# Patient Record
Sex: FEMALE | Race: BLACK OR AFRICAN AMERICAN | NOT HISPANIC OR LATINO | URBAN - METROPOLITAN AREA
[De-identification: names, ages, dates, MRNs, and addresses within clinical notes are randomized per-mention and may not be internally consistent; named-entity substitution may affect disease eponyms.]

---

## 2017-04-25 ENCOUNTER — OUTPATIENT (OUTPATIENT)
Dept: OUTPATIENT SERVICES | Facility: HOSPITAL | Age: 37
LOS: 1 days | End: 2017-04-25
Payer: MEDICAID

## 2017-04-25 DIAGNOSIS — Z3A.00 WEEKS OF GESTATION OF PREGNANCY NOT SPECIFIED: ICD-10-CM

## 2017-04-25 DIAGNOSIS — O26.899 OTHER SPECIFIED PREGNANCY RELATED CONDITIONS, UNSPECIFIED TRIMESTER: ICD-10-CM

## 2017-04-25 LAB
APPEARANCE UR: CLEAR — SIGNIFICANT CHANGE UP
BILIRUB UR-MCNC: NEGATIVE — SIGNIFICANT CHANGE UP
BLOOD UR QL VISUAL: NEGATIVE — SIGNIFICANT CHANGE UP
COLOR SPEC: SIGNIFICANT CHANGE UP
GLUCOSE UR-MCNC: NEGATIVE — SIGNIFICANT CHANGE UP
KETONES UR-MCNC: NEGATIVE — SIGNIFICANT CHANGE UP
LEUKOCYTE ESTERASE UR-ACNC: NEGATIVE — SIGNIFICANT CHANGE UP
MUCOUS THREADS # UR AUTO: SIGNIFICANT CHANGE UP
NITRITE UR-MCNC: NEGATIVE — SIGNIFICANT CHANGE UP
PH UR: 7 — SIGNIFICANT CHANGE UP (ref 4.6–8)
PROT UR-MCNC: NEGATIVE — SIGNIFICANT CHANGE UP
RBC CASTS # UR COMP ASSIST: SIGNIFICANT CHANGE UP (ref 0–?)
SP GR SPEC: 1.01 — SIGNIFICANT CHANGE UP (ref 1–1.03)
SQUAMOUS # UR AUTO: SIGNIFICANT CHANGE UP
UROBILINOGEN FLD QL: NORMAL E.U. — SIGNIFICANT CHANGE UP (ref 0.1–0.2)
WBC UR QL: SIGNIFICANT CHANGE UP (ref 0–?)

## 2017-04-25 PROCEDURE — 76817 TRANSVAGINAL US OBSTETRIC: CPT | Mod: 26

## 2017-04-25 PROCEDURE — 76815 OB US LIMITED FETUS(S): CPT | Mod: 26

## 2017-04-25 PROCEDURE — 99203 OFFICE O/P NEW LOW 30 MIN: CPT | Mod: 25

## 2017-05-28 ENCOUNTER — OUTPATIENT (OUTPATIENT)
Dept: OUTPATIENT SERVICES | Facility: HOSPITAL | Age: 37
LOS: 1 days | End: 2017-05-28
Payer: MEDICAID

## 2017-05-28 DIAGNOSIS — O26.899 OTHER SPECIFIED PREGNANCY RELATED CONDITIONS, UNSPECIFIED TRIMESTER: ICD-10-CM

## 2017-05-28 LAB
APPEARANCE UR: CLEAR — SIGNIFICANT CHANGE UP
BILIRUB UR-MCNC: NEGATIVE — SIGNIFICANT CHANGE UP
BLOOD UR QL VISUAL: NEGATIVE — SIGNIFICANT CHANGE UP
COLOR SPEC: SIGNIFICANT CHANGE UP
GLUCOSE UR-MCNC: NEGATIVE — SIGNIFICANT CHANGE UP
KETONES UR-MCNC: SIGNIFICANT CHANGE UP
LEUKOCYTE ESTERASE UR-ACNC: NEGATIVE — SIGNIFICANT CHANGE UP
MUCOUS THREADS # UR AUTO: SIGNIFICANT CHANGE UP
NITRITE UR-MCNC: NEGATIVE — SIGNIFICANT CHANGE UP
PH UR: 6.5 — SIGNIFICANT CHANGE UP (ref 4.6–8)
PROT UR-MCNC: NEGATIVE — SIGNIFICANT CHANGE UP
RBC CASTS # UR COMP ASSIST: SIGNIFICANT CHANGE UP (ref 0–?)
SP GR SPEC: 1.01 — SIGNIFICANT CHANGE UP (ref 1–1.03)
SQUAMOUS # UR AUTO: SIGNIFICANT CHANGE UP
UROBILINOGEN FLD QL: NORMAL E.U. — SIGNIFICANT CHANGE UP (ref 0.1–0.2)
WBC UR QL: SIGNIFICANT CHANGE UP (ref 0–?)

## 2017-05-28 PROCEDURE — 99214 OFFICE O/P EST MOD 30 MIN: CPT | Mod: 25

## 2017-05-28 PROCEDURE — 76817 TRANSVAGINAL US OBSTETRIC: CPT | Mod: 26

## 2017-05-28 PROCEDURE — 76818 FETAL BIOPHYS PROFILE W/NST: CPT | Mod: 26

## 2017-05-28 RX ORDER — ACETAMINOPHEN 500 MG
975 TABLET ORAL ONCE
Qty: 0 | Refills: 0 | Status: COMPLETED | OUTPATIENT
Start: 2017-05-28 | End: 2017-05-28

## 2017-05-28 RX ORDER — NITROFURANTOIN MACROCRYSTAL 50 MG
1 CAPSULE ORAL
Qty: 14 | Refills: 0 | OUTPATIENT
Start: 2017-05-28 | End: 2017-06-04

## 2017-05-28 RX ADMIN — Medication 975 MILLIGRAM(S): at 06:42

## 2017-05-29 LAB
BACTERIA UR CULT: SIGNIFICANT CHANGE UP
SPECIMEN SOURCE: SIGNIFICANT CHANGE UP

## 2017-07-22 ENCOUNTER — OUTPATIENT (OUTPATIENT)
Dept: OUTPATIENT SERVICES | Facility: HOSPITAL | Age: 37
LOS: 1 days | End: 2017-07-22

## 2017-07-22 DIAGNOSIS — O26.899 OTHER SPECIFIED PREGNANCY RELATED CONDITIONS, UNSPECIFIED TRIMESTER: ICD-10-CM

## 2017-07-22 DIAGNOSIS — Z3A.00 WEEKS OF GESTATION OF PREGNANCY NOT SPECIFIED: ICD-10-CM

## 2017-07-22 RX ORDER — CITRIC ACID/SODIUM CITRATE 300-500 MG
30 SOLUTION, ORAL ORAL ONCE
Qty: 0 | Refills: 0 | Status: COMPLETED | OUTPATIENT
Start: 2017-07-22 | End: 2017-07-22

## 2017-07-22 RX ADMIN — Medication 30 MILLILITER(S): at 11:40

## 2017-07-26 ENCOUNTER — INPATIENT (INPATIENT)
Facility: HOSPITAL | Age: 37
LOS: 3 days | Discharge: HOME | End: 2017-07-30
Attending: OBSTETRICS & GYNECOLOGY | Admitting: OBSTETRICS & GYNECOLOGY

## 2017-07-26 VITALS — WEIGHT: 202.83 LBS | HEIGHT: 65 IN

## 2017-07-26 DIAGNOSIS — O26.899 OTHER SPECIFIED PREGNANCY RELATED CONDITIONS, UNSPECIFIED TRIMESTER: ICD-10-CM

## 2017-07-26 DIAGNOSIS — O41.00X0 OLIGOHYDRAMNIOS, UNSPECIFIED TRIMESTER, NOT APPLICABLE OR UNSPECIFIED: ICD-10-CM

## 2017-07-26 DIAGNOSIS — Z3A.00 WEEKS OF GESTATION OF PREGNANCY NOT SPECIFIED: ICD-10-CM

## 2017-07-26 LAB
BASOPHILS # BLD AUTO: 0.03 K/UL — SIGNIFICANT CHANGE UP (ref 0–0.2)
BASOPHILS NFR BLD AUTO: 0.4 % — SIGNIFICANT CHANGE UP (ref 0–2)
BLD GP AB SCN SERPL QL: NEGATIVE — SIGNIFICANT CHANGE UP
EOSINOPHIL # BLD AUTO: 0.09 K/UL — SIGNIFICANT CHANGE UP (ref 0–0.5)
EOSINOPHIL NFR BLD AUTO: 1.2 % — SIGNIFICANT CHANGE UP (ref 0–6)
HCT VFR BLD CALC: 38.2 % — SIGNIFICANT CHANGE UP (ref 34.5–45)
HGB BLD-MCNC: 13.1 G/DL — SIGNIFICANT CHANGE UP (ref 11.5–15.5)
IMM GRANULOCYTES # BLD AUTO: 0.04 # — SIGNIFICANT CHANGE UP
IMM GRANULOCYTES NFR BLD AUTO: 0.5 % — SIGNIFICANT CHANGE UP (ref 0–1.5)
LYMPHOCYTES # BLD AUTO: 1.6 K/UL — SIGNIFICANT CHANGE UP (ref 1–3.3)
LYMPHOCYTES # BLD AUTO: 21.1 % — SIGNIFICANT CHANGE UP (ref 13–44)
MCHC RBC-ENTMCNC: 28.7 PG — SIGNIFICANT CHANGE UP (ref 27–34)
MCHC RBC-ENTMCNC: 34.3 % — SIGNIFICANT CHANGE UP (ref 32–36)
MCV RBC AUTO: 83.8 FL — SIGNIFICANT CHANGE UP (ref 80–100)
MONOCYTES # BLD AUTO: 0.71 K/UL — SIGNIFICANT CHANGE UP (ref 0–0.9)
MONOCYTES NFR BLD AUTO: 9.3 % — SIGNIFICANT CHANGE UP (ref 2–14)
NEUTROPHILS # BLD AUTO: 5.13 K/UL — SIGNIFICANT CHANGE UP (ref 1.8–7.4)
NEUTROPHILS NFR BLD AUTO: 67.5 % — SIGNIFICANT CHANGE UP (ref 43–77)
NRBC # FLD: 0 — SIGNIFICANT CHANGE UP
PLATELET # BLD AUTO: 218 K/UL — SIGNIFICANT CHANGE UP (ref 150–400)
PMV BLD: 13.1 FL — HIGH (ref 7–13)
RBC # BLD: 4.56 M/UL — SIGNIFICANT CHANGE UP (ref 3.8–5.2)
RBC # FLD: 14.9 % — HIGH (ref 10.3–14.5)
RH IG SCN BLD-IMP: POSITIVE — SIGNIFICANT CHANGE UP
RH IG SCN BLD-IMP: POSITIVE — SIGNIFICANT CHANGE UP
WBC # BLD: 7.6 K/UL — SIGNIFICANT CHANGE UP (ref 3.8–10.5)
WBC # FLD AUTO: 7.6 K/UL — SIGNIFICANT CHANGE UP (ref 3.8–10.5)

## 2017-07-26 RX ORDER — OXYTOCIN 10 UNIT/ML
333.33 VIAL (ML) INJECTION
Qty: 20 | Refills: 0 | Status: DISCONTINUED | OUTPATIENT
Start: 2017-07-26 | End: 2017-07-27

## 2017-07-26 RX ORDER — SODIUM CHLORIDE 9 MG/ML
1000 INJECTION, SOLUTION INTRAVENOUS ONCE
Qty: 0 | Refills: 0 | Status: DISCONTINUED | OUTPATIENT
Start: 2017-07-26 | End: 2017-07-27

## 2017-07-26 RX ORDER — SODIUM CHLORIDE 9 MG/ML
1000 INJECTION, SOLUTION INTRAVENOUS
Qty: 0 | Refills: 0 | Status: DISCONTINUED | OUTPATIENT
Start: 2017-07-26 | End: 2017-07-27

## 2017-07-26 RX ADMIN — SODIUM CHLORIDE 125 MILLILITER(S): 9 INJECTION, SOLUTION INTRAVENOUS at 19:30

## 2017-07-27 LAB
HBV SURFACE AG SER-ACNC: NEGATIVE — SIGNIFICANT CHANGE UP
T PALLIDUM AB TITR SER: NEGATIVE — SIGNIFICANT CHANGE UP

## 2017-07-27 RX ORDER — METOCLOPRAMIDE HCL 10 MG
10 TABLET ORAL ONCE
Qty: 0 | Refills: 0 | Status: DISCONTINUED | OUTPATIENT
Start: 2017-07-27 | End: 2017-07-27

## 2017-07-27 RX ORDER — KETOROLAC TROMETHAMINE 30 MG/ML
30 SYRINGE (ML) INJECTION EVERY 6 HOURS
Qty: 0 | Refills: 0 | Status: DISCONTINUED | OUTPATIENT
Start: 2017-07-27 | End: 2017-07-28

## 2017-07-27 RX ORDER — FERROUS SULFATE 325(65) MG
325 TABLET ORAL DAILY
Qty: 0 | Refills: 0 | Status: DISCONTINUED | OUTPATIENT
Start: 2017-07-27 | End: 2017-07-30

## 2017-07-27 RX ORDER — IBUPROFEN 200 MG
600 TABLET ORAL EVERY 6 HOURS
Qty: 0 | Refills: 0 | Status: COMPLETED | OUTPATIENT
Start: 2017-07-27 | End: 2018-06-25

## 2017-07-27 RX ORDER — SODIUM CHLORIDE 9 MG/ML
300 INJECTION INTRAMUSCULAR; INTRAVENOUS; SUBCUTANEOUS ONCE
Qty: 0 | Refills: 0 | Status: DISCONTINUED | OUTPATIENT
Start: 2017-07-27 | End: 2017-07-27

## 2017-07-27 RX ORDER — OXYCODONE HYDROCHLORIDE 5 MG/1
5 TABLET ORAL
Qty: 0 | Refills: 0 | Status: DISCONTINUED | OUTPATIENT
Start: 2017-07-27 | End: 2017-07-27

## 2017-07-27 RX ORDER — BUTORPHANOL TARTRATE 2 MG/ML
2 INJECTION, SOLUTION INTRAMUSCULAR; INTRAVENOUS ONCE
Qty: 0 | Refills: 0 | Status: DISCONTINUED | OUTPATIENT
Start: 2017-07-27 | End: 2017-07-27

## 2017-07-27 RX ORDER — IBUPROFEN 200 MG
600 TABLET ORAL EVERY 6 HOURS
Qty: 0 | Refills: 0 | Status: DISCONTINUED | OUTPATIENT
Start: 2017-07-27 | End: 2017-07-27

## 2017-07-27 RX ORDER — OXYCODONE HYDROCHLORIDE 5 MG/1
5 TABLET ORAL
Qty: 0 | Refills: 0 | Status: COMPLETED | OUTPATIENT
Start: 2017-07-27 | End: 2017-08-03

## 2017-07-27 RX ORDER — TETANUS TOXOID, REDUCED DIPHTHERIA TOXOID AND ACELLULAR PERTUSSIS VACCINE, ADSORBED 5; 2.5; 8; 8; 2.5 [IU]/.5ML; [IU]/.5ML; UG/.5ML; UG/.5ML; UG/.5ML
0.5 SUSPENSION INTRAMUSCULAR ONCE
Qty: 0 | Refills: 0 | Status: DISCONTINUED | OUTPATIENT
Start: 2017-07-27 | End: 2017-07-30

## 2017-07-27 RX ORDER — CITRIC ACID/SODIUM CITRATE 300-500 MG
30 SOLUTION, ORAL ORAL ONCE
Qty: 0 | Refills: 0 | Status: COMPLETED | OUTPATIENT
Start: 2017-07-27 | End: 2017-07-27

## 2017-07-27 RX ORDER — DOCUSATE SODIUM 100 MG
100 CAPSULE ORAL
Qty: 0 | Refills: 0 | Status: DISCONTINUED | OUTPATIENT
Start: 2017-07-27 | End: 2017-07-30

## 2017-07-27 RX ORDER — SODIUM CHLORIDE 9 MG/ML
1000 INJECTION, SOLUTION INTRAVENOUS
Qty: 0 | Refills: 0 | Status: DISCONTINUED | OUTPATIENT
Start: 2017-07-27 | End: 2017-07-28

## 2017-07-27 RX ORDER — OXYTOCIN 10 UNIT/ML
333.33 VIAL (ML) INJECTION
Qty: 20 | Refills: 0 | Status: DISCONTINUED | OUTPATIENT
Start: 2017-07-27 | End: 2017-07-27

## 2017-07-27 RX ORDER — OXYCODONE HYDROCHLORIDE 5 MG/1
10 TABLET ORAL
Qty: 0 | Refills: 0 | Status: DISCONTINUED | OUTPATIENT
Start: 2017-07-27 | End: 2017-07-27

## 2017-07-27 RX ORDER — HYDROMORPHONE HYDROCHLORIDE 2 MG/ML
0.5 INJECTION INTRAMUSCULAR; INTRAVENOUS; SUBCUTANEOUS
Qty: 0 | Refills: 0 | Status: DISCONTINUED | OUTPATIENT
Start: 2017-07-27 | End: 2017-07-27

## 2017-07-27 RX ORDER — METOCLOPRAMIDE HCL 10 MG
10 TABLET ORAL ONCE
Qty: 0 | Refills: 0 | Status: COMPLETED | OUTPATIENT
Start: 2017-07-27 | End: 2017-07-27

## 2017-07-27 RX ORDER — OXYCODONE HYDROCHLORIDE 5 MG/1
5 TABLET ORAL EVERY 4 HOURS
Qty: 0 | Refills: 0 | Status: DISCONTINUED | OUTPATIENT
Start: 2017-07-27 | End: 2017-07-30

## 2017-07-27 RX ORDER — KETOROLAC TROMETHAMINE 30 MG/ML
30 SYRINGE (ML) INJECTION EVERY 6 HOURS
Qty: 0 | Refills: 0 | Status: DISCONTINUED | OUTPATIENT
Start: 2017-07-27 | End: 2017-07-27

## 2017-07-27 RX ORDER — SODIUM CHLORIDE 9 MG/ML
1000 INJECTION, SOLUTION INTRAVENOUS
Qty: 0 | Refills: 0 | Status: DISCONTINUED | OUTPATIENT
Start: 2017-07-27 | End: 2017-07-27

## 2017-07-27 RX ORDER — MORPHINE SULFATE 50 MG/1
3 CAPSULE, EXTENDED RELEASE ORAL ONCE
Qty: 0 | Refills: 0 | Status: DISCONTINUED | OUTPATIENT
Start: 2017-07-27 | End: 2017-07-28

## 2017-07-27 RX ORDER — OXYTOCIN 10 UNIT/ML
41.67 VIAL (ML) INJECTION
Qty: 20 | Refills: 0 | Status: DISCONTINUED | OUTPATIENT
Start: 2017-07-27 | End: 2017-07-27

## 2017-07-27 RX ORDER — FAMOTIDINE 10 MG/ML
20 INJECTION INTRAVENOUS ONCE
Qty: 0 | Refills: 0 | Status: COMPLETED | OUTPATIENT
Start: 2017-07-27 | End: 2017-07-27

## 2017-07-27 RX ORDER — SODIUM CHLORIDE 9 MG/ML
1000 INJECTION INTRAMUSCULAR; INTRAVENOUS; SUBCUTANEOUS
Qty: 0 | Refills: 0 | Status: DISCONTINUED | OUTPATIENT
Start: 2017-07-27 | End: 2017-07-27

## 2017-07-27 RX ORDER — SODIUM CHLORIDE 9 MG/ML
500 INJECTION, SOLUTION INTRAVENOUS ONCE
Qty: 0 | Refills: 0 | Status: COMPLETED | OUTPATIENT
Start: 2017-07-27 | End: 2017-07-27

## 2017-07-27 RX ORDER — FENTANYL CITRATE 50 UG/ML
25 INJECTION INTRAVENOUS
Qty: 0 | Refills: 0 | Status: DISCONTINUED | OUTPATIENT
Start: 2017-07-27 | End: 2017-07-27

## 2017-07-27 RX ORDER — OXYCODONE HYDROCHLORIDE 5 MG/1
5 TABLET ORAL EVERY 4 HOURS
Qty: 0 | Refills: 0 | Status: DISCONTINUED | OUTPATIENT
Start: 2017-07-27 | End: 2017-07-27

## 2017-07-27 RX ORDER — NALOXONE HYDROCHLORIDE 4 MG/.1ML
0.1 SPRAY NASAL
Qty: 0 | Refills: 0 | Status: DISCONTINUED | OUTPATIENT
Start: 2017-07-27 | End: 2017-07-28

## 2017-07-27 RX ORDER — SIMETHICONE 80 MG/1
80 TABLET, CHEWABLE ORAL EVERY 4 HOURS
Qty: 0 | Refills: 0 | Status: DISCONTINUED | OUTPATIENT
Start: 2017-07-27 | End: 2017-07-30

## 2017-07-27 RX ORDER — LANOLIN
1 OINTMENT (GRAM) TOPICAL
Qty: 0 | Refills: 0 | Status: DISCONTINUED | OUTPATIENT
Start: 2017-07-27 | End: 2017-07-30

## 2017-07-27 RX ORDER — ACETAMINOPHEN 500 MG
975 TABLET ORAL EVERY 6 HOURS
Qty: 0 | Refills: 0 | Status: DISCONTINUED | OUTPATIENT
Start: 2017-07-27 | End: 2017-07-30

## 2017-07-27 RX ORDER — ONDANSETRON 8 MG/1
4 TABLET, FILM COATED ORAL EVERY 6 HOURS
Qty: 0 | Refills: 0 | Status: DISCONTINUED | OUTPATIENT
Start: 2017-07-27 | End: 2017-07-28

## 2017-07-27 RX ORDER — DIPHENHYDRAMINE HCL 50 MG
25 CAPSULE ORAL EVERY 6 HOURS
Qty: 0 | Refills: 0 | Status: DISCONTINUED | OUTPATIENT
Start: 2017-07-27 | End: 2017-07-30

## 2017-07-27 RX ORDER — HEPARIN SODIUM 5000 [USP'U]/ML
5000 INJECTION INTRAVENOUS; SUBCUTANEOUS EVERY 12 HOURS
Qty: 0 | Refills: 0 | Status: DISCONTINUED | OUTPATIENT
Start: 2017-07-27 | End: 2017-07-30

## 2017-07-27 RX ORDER — GLYCERIN ADULT
1 SUPPOSITORY, RECTAL RECTAL AT BEDTIME
Qty: 0 | Refills: 0 | Status: DISCONTINUED | OUTPATIENT
Start: 2017-07-27 | End: 2017-07-30

## 2017-07-27 RX ADMIN — HYDROMORPHONE HYDROCHLORIDE 0.5 MILLIGRAM(S): 2 INJECTION INTRAMUSCULAR; INTRAVENOUS; SUBCUTANEOUS at 11:28

## 2017-07-27 RX ADMIN — HYDROMORPHONE HYDROCHLORIDE 0.5 MILLIGRAM(S): 2 INJECTION INTRAMUSCULAR; INTRAVENOUS; SUBCUTANEOUS at 11:08

## 2017-07-27 RX ADMIN — Medication 30 MILLILITER(S): at 07:33

## 2017-07-27 RX ADMIN — Medication 25 MILLIGRAM(S): at 14:41

## 2017-07-27 RX ADMIN — FAMOTIDINE 20 MILLIGRAM(S): 10 INJECTION INTRAVENOUS at 07:27

## 2017-07-27 RX ADMIN — SODIUM CHLORIDE 125 MILLILITER(S): 9 INJECTION, SOLUTION INTRAVENOUS at 18:54

## 2017-07-27 RX ADMIN — Medication 204 MILLIGRAM(S): at 04:40

## 2017-07-27 RX ADMIN — Medication 30 MILLIGRAM(S): at 18:54

## 2017-07-27 RX ADMIN — Medication 1 APPLICATION(S): at 23:36

## 2017-07-27 RX ADMIN — SODIUM CHLORIDE 1000 MILLILITER(S): 9 INJECTION, SOLUTION INTRAVENOUS at 09:52

## 2017-07-27 RX ADMIN — Medication 30 MILLIGRAM(S): at 18:21

## 2017-07-27 RX ADMIN — Medication 975 MILLIGRAM(S): at 11:34

## 2017-07-27 RX ADMIN — SODIUM CHLORIDE 125 MILLILITER(S): 9 INJECTION, SOLUTION INTRAVENOUS at 05:23

## 2017-07-27 RX ADMIN — Medication 30 MILLIGRAM(S): at 23:36

## 2017-07-27 RX ADMIN — HEPARIN SODIUM 5000 UNIT(S): 5000 INJECTION INTRAVENOUS; SUBCUTANEOUS at 14:41

## 2017-07-27 RX ADMIN — Medication 10 MILLIGRAM(S): at 07:27

## 2017-07-27 RX ADMIN — ONDANSETRON 4 MILLIGRAM(S): 8 TABLET, FILM COATED ORAL at 15:46

## 2017-07-27 RX ADMIN — BUTORPHANOL TARTRATE 2 MILLIGRAM(S): 2 INJECTION, SOLUTION INTRAMUSCULAR; INTRAVENOUS at 04:41

## 2017-07-27 RX ADMIN — BUTORPHANOL TARTRATE 2 MILLIGRAM(S): 2 INJECTION, SOLUTION INTRAMUSCULAR; INTRAVENOUS at 05:22

## 2017-07-28 LAB
HCT VFR BLD CALC: 36 % — SIGNIFICANT CHANGE UP (ref 34.5–45)
HGB BLD-MCNC: 11.8 G/DL — SIGNIFICANT CHANGE UP (ref 11.5–15.5)
MCHC RBC-ENTMCNC: 27.8 PG — SIGNIFICANT CHANGE UP (ref 27–34)
MCHC RBC-ENTMCNC: 32.8 % — SIGNIFICANT CHANGE UP (ref 32–36)
MCV RBC AUTO: 84.7 FL — SIGNIFICANT CHANGE UP (ref 80–100)
NRBC # FLD: 0 — SIGNIFICANT CHANGE UP
PLATELET # BLD AUTO: 172 K/UL — SIGNIFICANT CHANGE UP (ref 150–400)
PMV BLD: 12.9 FL — SIGNIFICANT CHANGE UP (ref 7–13)
RBC # BLD: 4.25 M/UL — SIGNIFICANT CHANGE UP (ref 3.8–5.2)
RBC # FLD: 15 % — HIGH (ref 10.3–14.5)
WBC # BLD: 12.94 K/UL — HIGH (ref 3.8–10.5)
WBC # FLD AUTO: 12.94 K/UL — HIGH (ref 3.8–10.5)

## 2017-07-28 RX ORDER — OXYCODONE HYDROCHLORIDE 5 MG/1
5 TABLET ORAL
Qty: 0 | Refills: 0 | Status: DISCONTINUED | OUTPATIENT
Start: 2017-07-28 | End: 2017-07-30

## 2017-07-28 RX ORDER — IBUPROFEN 200 MG
600 TABLET ORAL EVERY 6 HOURS
Qty: 0 | Refills: 0 | Status: DISCONTINUED | OUTPATIENT
Start: 2017-07-28 | End: 2017-07-30

## 2017-07-28 RX ADMIN — Medication 30 MILLIGRAM(S): at 00:05

## 2017-07-28 RX ADMIN — Medication 975 MILLIGRAM(S): at 17:17

## 2017-07-28 RX ADMIN — OXYCODONE HYDROCHLORIDE 5 MILLIGRAM(S): 5 TABLET ORAL at 23:32

## 2017-07-28 RX ADMIN — Medication 600 MILLIGRAM(S): at 23:02

## 2017-07-28 RX ADMIN — Medication 975 MILLIGRAM(S): at 23:02

## 2017-07-28 RX ADMIN — HEPARIN SODIUM 5000 UNIT(S): 5000 INJECTION INTRAVENOUS; SUBCUTANEOUS at 17:17

## 2017-07-28 RX ADMIN — Medication 100 MILLIGRAM(S): at 12:26

## 2017-07-28 RX ADMIN — HEPARIN SODIUM 5000 UNIT(S): 5000 INJECTION INTRAVENOUS; SUBCUTANEOUS at 05:43

## 2017-07-28 RX ADMIN — Medication 30 MILLIGRAM(S): at 06:15

## 2017-07-28 RX ADMIN — Medication 600 MILLIGRAM(S): at 17:17

## 2017-07-28 RX ADMIN — Medication 600 MILLIGRAM(S): at 12:27

## 2017-07-28 RX ADMIN — Medication 975 MILLIGRAM(S): at 12:26

## 2017-07-28 RX ADMIN — Medication 1 TABLET(S): at 12:26

## 2017-07-28 RX ADMIN — Medication 600 MILLIGRAM(S): at 12:26

## 2017-07-28 RX ADMIN — SIMETHICONE 80 MILLIGRAM(S): 80 TABLET, CHEWABLE ORAL at 12:26

## 2017-07-28 RX ADMIN — Medication 30 MILLIGRAM(S): at 05:43

## 2017-07-28 RX ADMIN — Medication 975 MILLIGRAM(S): at 05:43

## 2017-07-28 RX ADMIN — OXYCODONE HYDROCHLORIDE 5 MILLIGRAM(S): 5 TABLET ORAL at 23:02

## 2017-07-28 RX ADMIN — Medication 600 MILLIGRAM(S): at 23:32

## 2017-07-28 NOTE — DISCHARGE NOTE OB - MEDICATION SUMMARY - MEDICATIONS TO STOP TAKING
I will STOP taking the medications listed below when I get home from the hospital:  None I will STOP taking the medications listed below when I get home from the hospital:    Macrobid macrocrystals-monohydrate 100 mg oral capsule  -- 1 cap(s) by mouth 2 times a day  -- Finish all this medication unless otherwise directed by prescriber.  May discolor urine or feces.  Take with food or milk.

## 2017-07-28 NOTE — DISCHARGE NOTE OB - HOSPITAL COURSE
Admitted with for category 2 tracing.  Labor induced.   section performed for fetal inability to tolerate labor.  Delivered liveborn male infant. Mother and baby stable.

## 2017-07-28 NOTE — PROGRESS NOTE ADULT - PROBLEM SELECTOR PLAN 1
- Continue regular diet.  - Increase ambulation.  - Continue motrin, tylenol, oxycodone PRN for pain control.  .  - Discontinue harris catheter at 24 hours post-op   - F/u AM CBC    Cristine Lynn PGY-1

## 2017-07-28 NOTE — DISCHARGE NOTE OB - MEDICATION SUMMARY - MEDICATIONS TO TAKE
I will START or STAY ON the medications listed below when I get home from the hospital:    ibuprofen 600 mg oral tablet  -- 1 tab(s) by mouth every 6 hours  -- Indication: For pain    acetaminophen 325 mg oral tablet  -- 3 tab(s) by mouth every 6 hours  -- Indication: For pain    Prenatal Multivitamins with Folic Acid 1 mg oral tablet  -- 1 tab(s) by mouth once a day  -- Indication: For breastfeeding I will START or STAY ON the medications listed below when I get home from the hospital:    acetaminophen 325 mg oral tablet  -- 3 tab(s) by mouth every 6 hours  -- Indication: For pain    ibuprofen 600 mg oral tablet  -- 1 tab(s) by mouth every 6 hours, As Needed -for mild pain MDD:4  -- Indication: For pain    oxycodone-acetaminophen 5mg-325mg oral tablet  -- 1 tab(s) by mouth every 6 hours, As Needed -for moderate pain MDD:4  -- Caution federal law prohibits the transfer of this drug to any person other  than the person for whom it was prescribed.  May cause drowsiness.  Alcohol may intensify this effect.  Use care when operating dangerous machinery.  This prescription cannot be refilled.  This product contains acetaminophen.  Do not use  with any other product containing acetaminophen to prevent possible liver damage.  Using more of this medication than prescribed may cause serious breathing problems.    -- Indication: For pain    Prenatal Multivitamins with Folic Acid 1 mg oral tablet  -- 1 tab(s) by mouth once a day  -- Indication: For breastfeeding I will START or STAY ON the medications listed below when I get home from the hospital:    acetaminophen 325 mg oral tablet  -- 3 tab(s) by mouth every 6 hours  -- Indication: For pain    ibuprofen 600 mg oral tablet  -- 1 tab(s) by mouth every 6 hours, As Needed -for mild pain MDD:4  -- Indication: For pain    Prenatal Multivitamins with Folic Acid 1 mg oral tablet  -- 1 tab(s) by mouth once a day  -- Indication: For breastfeeding

## 2017-07-28 NOTE — DISCHARGE NOTE OB - PATIENT PORTAL LINK FT
“You can access the [employer] Patient Portal, offered by [facility], by registering with the following website: [email or web address]”

## 2017-07-28 NOTE — PROGRESS NOTE ADULT - SUBJECTIVE AND OBJECTIVE BOX
Postop Day  __1_ s/p   C- Section    THERAPY:  [  ] Spinal morphine  [ x ] Epidural morphine   [  ] IV PCA Hydromorphone 1 mg/ml      Sedation Score:	  [ x ] Alert	    [  ] Drowsy        [  ] Arousable	[  ] Asleep	[  ] Unresponsive    Side Effects:	  [ x ] None	     [  ] Nausea        [  ] Pruritus        [  ] Weakness   [  ] Numbness        ASSESSMENT/ PLAN   [   ] Discontinue         [  ] Continue  [ x ]Documentation and Verification of current medications     Comments: Transitioned to prn oral analgesics by primary team. No anesthetic complication.

## 2017-07-28 NOTE — PROGRESS NOTE ADULT - SUBJECTIVE AND OBJECTIVE BOX
Day ___1 of Anesthesia Pain Management Service    SUBJECTIVE:  Pain Scale Score	At rest: __none_ 	With Activity: __mild_ 	[x ] Refer to charted pain scores    THERAPY:    s/p ____3__ mg PF morphine on ____7/27/17_    OBJECTIVE:    Sedation Score:	[ x] Alert	[ ] Drowsy	[ ] Arousable	[ ] Asleep	[ ] Unresponsive    Side Effects:	[x ] None	[ ] Nausea	[ ] Vomiting	[ ] Pruritus  		  [ ] Weakness		[ ] Numbness	[ ] Other:    Vital Signs Last 24 Hrs  T(C): 37.3 (28 Jul 2017 05:49), Max: 37.4 (27 Jul 2017 21:57)  T(F): 99.1 (28 Jul 2017 05:49), Max: 99.3 (27 Jul 2017 21:57)  HR: 98 (28 Jul 2017 05:49) (76 - 113)  BP: 101/55 (28 Jul 2017 05:49) (101/55 - 125/76)  BP(mean): 70 (27 Jul 2017 11:30) (70 - 84)  RR: 18 (28 Jul 2017 05:49) (16 - 22)  SpO2: 98% (28 Jul 2017 05:49) (95% - 100%)    ASSESSMENT/ PLAN  [x ] Patient transitioned to prn analgesics  [ x] Pain management per primary service, pain service to sign off   [ x]Documentation and Verification of current medications     Comments: No anesthetic complications.

## 2017-07-28 NOTE — DISCHARGE NOTE OB - CARE PLAN
Goal:	normal postoperative course  Instructions for follow-up, activity and diet:	nothing in vagina, no heavy lifting Principal Discharge DX:	 delivery delivered  Goal:	normal postoperative course  Instructions for follow-up, activity and diet:	nothing in vagina, no heavy lifting

## 2017-07-28 NOTE — PROGRESS NOTE ADULT - SUBJECTIVE AND OBJECTIVE BOX
OB Progress Note:  Delivery, POD#1    S: 36yo POD#1 s/p LTCS . Her pain is well controlled. She is tolerating a regular diet and passing flatus. Denies N/V. Denies CP/SOB/lightheadedness/dizziness.   She has been minimally ambulating in room without difficulty.  Indwelling catheter is in place - to be removed this AM.      O:   Vital Signs Last 24 Hrs  T(C): 37.3 (2017 05:49), Max: 37.4 (2017 21:57)  T(F): 99.1 (2017 05:49), Max: 99.3 (2017 21:57)  HR: 98 (2017 05:49) (76 - 113)  BP: 101/55 (2017 05:49) (101/55 - 125/76)  BP(mean): 70 (2017 11:30) (70 - 84)  RR: 18 (2017 05:49) (16 - 22)  SpO2: 98% (2017 05:49) (95% - 100%)    Labs:  Blood type: O Positive  Rubella IgG: RPR: Negative                          13.1   7.60 >-----------< 218    ( - @ 17:30 )             38.2                  PE:  General: NAD  Abdomen: Mildly distended, appropriately tender, incision c/d/i.  Extremities: No erythema, no pitting edema

## 2017-07-28 NOTE — DISCHARGE NOTE OB - CARE PROVIDER_API CALL
Amber Aguirre (MD), Obstetrics and Gynecology  [address] [place] [address]  Phone: [phone]  Fax: [phone]

## 2017-07-29 LAB
RUBV IGG SER-ACNC: 7 INDEX — SIGNIFICANT CHANGE UP
RUBV IGG SER-IMP: POSITIVE — SIGNIFICANT CHANGE UP

## 2017-07-29 RX ORDER — IBUPROFEN 200 MG
1 TABLET ORAL
Qty: 0 | Refills: 0 | COMMUNITY
Start: 2017-07-29

## 2017-07-29 RX ORDER — ACETAMINOPHEN 500 MG
3 TABLET ORAL
Qty: 0 | Refills: 0 | COMMUNITY
Start: 2017-07-29

## 2017-07-29 RX ORDER — MAGNESIUM HYDROXIDE 400 MG/1
30 TABLET, CHEWABLE ORAL DAILY
Qty: 0 | Refills: 0 | Status: DISCONTINUED | OUTPATIENT
Start: 2017-07-29 | End: 2017-07-30

## 2017-07-29 RX ADMIN — Medication 975 MILLIGRAM(S): at 18:05

## 2017-07-29 RX ADMIN — MAGNESIUM HYDROXIDE 30 MILLILITER(S): 400 TABLET, CHEWABLE ORAL at 10:26

## 2017-07-29 RX ADMIN — Medication 600 MILLIGRAM(S): at 19:05

## 2017-07-29 RX ADMIN — Medication 600 MILLIGRAM(S): at 05:30

## 2017-07-29 RX ADMIN — Medication 600 MILLIGRAM(S): at 13:07

## 2017-07-29 RX ADMIN — OXYCODONE HYDROCHLORIDE 5 MILLIGRAM(S): 5 TABLET ORAL at 18:54

## 2017-07-29 RX ADMIN — HEPARIN SODIUM 5000 UNIT(S): 5000 INJECTION INTRAVENOUS; SUBCUTANEOUS at 05:29

## 2017-07-29 RX ADMIN — OXYCODONE HYDROCHLORIDE 5 MILLIGRAM(S): 5 TABLET ORAL at 15:20

## 2017-07-29 RX ADMIN — Medication 600 MILLIGRAM(S): at 06:00

## 2017-07-29 RX ADMIN — Medication 975 MILLIGRAM(S): at 12:07

## 2017-07-29 RX ADMIN — OXYCODONE HYDROCHLORIDE 5 MILLIGRAM(S): 5 TABLET ORAL at 19:54

## 2017-07-29 RX ADMIN — HEPARIN SODIUM 5000 UNIT(S): 5000 INJECTION INTRAVENOUS; SUBCUTANEOUS at 18:05

## 2017-07-29 RX ADMIN — Medication 975 MILLIGRAM(S): at 05:30

## 2017-07-29 RX ADMIN — Medication 600 MILLIGRAM(S): at 18:05

## 2017-07-29 RX ADMIN — OXYCODONE HYDROCHLORIDE 5 MILLIGRAM(S): 5 TABLET ORAL at 14:23

## 2017-07-29 RX ADMIN — Medication 600 MILLIGRAM(S): at 12:07

## 2017-07-29 RX ADMIN — Medication 325 MILLIGRAM(S): at 12:07

## 2017-07-29 RX ADMIN — Medication 1 TABLET(S): at 12:07

## 2017-07-29 NOTE — LACTATION INITIAL EVALUATION - LACTATION INTERVENTIONS
initiate hand expression routine/initiate skin to skin/assisted with deep latch and positioning  discussed  signs  of  effective  feeding and  swallowing., instructed in  hand  expression  and  pumping  between  feeding  to  stimulate  milk  supply  if  nbn  not  effective ,  discussed  belly  size  and  needs  of  nbn  ,  instructed  to  offer  breast  first   decrease  formula  as  milk  comes  in.

## 2017-07-29 NOTE — PROGRESS NOTE ADULT - SUBJECTIVE AND OBJECTIVE BOX
SUBJECTIVE:    Pain: Controlled    Complaints: None    MILESTONES:    Alert and Oriented x 3  [ x ]  Out of bed/ ambulating. [ x ]  Flatus:   Positive [ x ]  Negative [  ]  Bowel movement  [  ] Positive [  ] Negative   Voiding [x  ] Due to void [  ]   Mendez/Indwelling catheter in place [  ]  Diet: Regular [ x ]  Clears [  ]  NPO [  ]    Infant feeding:  Breast [  ]   Bottle [  ]  Both [ x ]  Feeding related issues and/or concerns:      OBJECTIVE:  T(C): 36.7 (17 @ 06:00), Max: 36.9 (17 @ 14:00)  HR: 99 (17 @ 06:00) (95 - 99)  BP: 100/60 (17 @ 06:00) (95/55 - 111/68)  RR: 18 (17 @ 06:00) (17 - 18)  SpO2: 100% (17 @ 06:00) (100% - 100%)  Wt(kg): --                        11.8   12.94 )-----------( 172      ( 2017 05:55 )             36.0           Blood Type: O Positive    RPR: Negative          MEDICATIONS  (STANDING):  acetaminophen   Tablet 975 milliGRAM(s) Oral every 6 hours  diphtheria/tetanus/pertussis (acellular) Vaccine (ADAcel) 0.5 milliLiter(s) IntraMuscular once  ferrous    sulfate 325 milliGRAM(s) Oral daily  prenatal multivitamin 1 Tablet(s) Oral daily  heparin  Injectable 5000 Unit(s) SubCutaneous every 12 hours  ibuprofen  Tablet 600 milliGRAM(s) Oral every 6 hours  oxyCODONE    IR 5 milliGRAM(s) Oral every 3 hours    MEDICATIONS  (PRN):  simethicone 80 milliGRAM(s) Chew every 4 hours PRN Gas  diphenhydrAMINE   Capsule 25 milliGRAM(s) Oral every 6 hours PRN Itching  glycerin Suppository - Adult 1 Suppository(s) Rectal at bedtime PRN Constipation  docusate sodium 100 milliGRAM(s) Oral two times a day PRN Stool Softening  lanolin Ointment 1 Application(s) Topical every 3 hours PRN Sore Nipples  oxyCODONE    IR 5 milliGRAM(s) Oral every 4 hours PRN Severe Pain (7 - 10)  magnesium hydroxide Suspension 30 milliLiter(s) Oral daily PRN Constipation        ASSESSMENT:    37y     G 2     P  1011       PO Day#  2        Delivery: Primary [ x ]    Repeat [  ]                                         Indication of procedure: Abnormal Fetal Status    Condition: Stable    Past Medical History significant for: HPI: Hx. HSV, Asthma      Current Issues:    Breasts:  Soft [x  ]   Engorged [  ]  Nipples:  Abdomen: Soft [ x ]   Distended [  ] Nontender [  ]   Bowel sounds :  Present [  ]  Absent [  ]   Fundus:  Firm [x  ]  Boggy [  ]  Abdominal incision: Clean, Dry and Intact [x  ]  Staples [  ] Steri Strips [ x ] Dermabond [  ] Sutures [  ]    Patient wearing abdominal binder for support.    Vaginal: Lochia:  Heavy [  ]  Moderate [ x ]   Scant [  ]  Extremities: Edema [  ] Negative Brittany's Sign [  ] Nontender Anurag  [ x ] Positive pedal pulses [  ]    Other relevant physical exam findings:      PLAN:    Plan: Increase ambulation, analgesia PRN and pain medication protocol standing oxycodone, ibuprofen and acetaminophen.    Diet: Regular diet    Continue routine post-operative and postpartum care.     Discharge Planning [ x ]    For discharge Today  [    ]    Consults:  Social Work [  ]  Lactation [ x ]  Other [         ]

## 2017-07-29 NOTE — LACTATION INITIAL EVALUATION - INTERVENTION OUTCOME
verbalizes understanding/demonstrates understanding of teaching/nbn demonstrated  deep latch and  performed  with sucking and swallowing  noted ,   pt instructed to notify pediatrician if nbn not feeding 8-12 times/24 hours and not voiding and stooling according to breastfeeding log.   lactation  list  and  phone  number  for   [facility]  breastfeeding  support  given,  rn  aware  of  plan  . rn  made aware of plan and to document latch and  positioning of  further  feeding and  assist  as  necessary.

## 2017-07-30 VITALS
RESPIRATION RATE: 17 BRPM | OXYGEN SATURATION: 97 % | DIASTOLIC BLOOD PRESSURE: 66 MMHG | TEMPERATURE: 98 F | SYSTOLIC BLOOD PRESSURE: 110 MMHG | HEART RATE: 94 BPM

## 2017-07-30 RX ORDER — IBUPROFEN 200 MG
1 TABLET ORAL
Qty: 40 | Refills: 1 | OUTPATIENT
Start: 2017-07-30

## 2017-07-30 RX ORDER — OXYCODONE HYDROCHLORIDE 5 MG/1
1 TABLET ORAL
Qty: 20 | Refills: 0 | OUTPATIENT
Start: 2017-07-30

## 2017-07-30 RX ADMIN — OXYCODONE HYDROCHLORIDE 5 MILLIGRAM(S): 5 TABLET ORAL at 00:46

## 2017-07-30 RX ADMIN — MAGNESIUM HYDROXIDE 30 MILLILITER(S): 400 TABLET, CHEWABLE ORAL at 08:24

## 2017-07-30 RX ADMIN — Medication 975 MILLIGRAM(S): at 00:46

## 2017-07-30 RX ADMIN — Medication 600 MILLIGRAM(S): at 06:32

## 2017-07-30 RX ADMIN — HEPARIN SODIUM 5000 UNIT(S): 5000 INJECTION INTRAVENOUS; SUBCUTANEOUS at 06:32

## 2017-07-30 RX ADMIN — OXYCODONE HYDROCHLORIDE 5 MILLIGRAM(S): 5 TABLET ORAL at 13:15

## 2017-07-30 RX ADMIN — Medication 600 MILLIGRAM(S): at 13:15

## 2017-07-30 RX ADMIN — Medication 325 MILLIGRAM(S): at 12:18

## 2017-07-30 RX ADMIN — OXYCODONE HYDROCHLORIDE 5 MILLIGRAM(S): 5 TABLET ORAL at 10:24

## 2017-07-30 RX ADMIN — Medication 600 MILLIGRAM(S): at 00:45

## 2017-07-30 RX ADMIN — Medication 600 MILLIGRAM(S): at 01:45

## 2017-07-30 RX ADMIN — OXYCODONE HYDROCHLORIDE 5 MILLIGRAM(S): 5 TABLET ORAL at 09:24

## 2017-07-30 RX ADMIN — Medication 600 MILLIGRAM(S): at 12:18

## 2017-07-30 RX ADMIN — OXYCODONE HYDROCHLORIDE 5 MILLIGRAM(S): 5 TABLET ORAL at 12:18

## 2017-07-30 RX ADMIN — Medication 975 MILLIGRAM(S): at 12:18

## 2017-07-30 RX ADMIN — OXYCODONE HYDROCHLORIDE 5 MILLIGRAM(S): 5 TABLET ORAL at 07:21

## 2017-07-30 RX ADMIN — SIMETHICONE 80 MILLIGRAM(S): 80 TABLET, CHEWABLE ORAL at 01:10

## 2017-07-30 RX ADMIN — Medication 1 TABLET(S): at 12:18

## 2017-07-30 RX ADMIN — Medication 975 MILLIGRAM(S): at 06:32

## 2017-07-30 RX ADMIN — Medication 600 MILLIGRAM(S): at 07:20

## 2017-07-30 RX ADMIN — OXYCODONE HYDROCHLORIDE 5 MILLIGRAM(S): 5 TABLET ORAL at 06:33

## 2017-07-30 RX ADMIN — OXYCODONE HYDROCHLORIDE 5 MILLIGRAM(S): 5 TABLET ORAL at 01:45

## 2017-07-30 NOTE — PROGRESS NOTE ADULT - SUBJECTIVE AND OBJECTIVE BOX
Postpartum Note,  Section  She is a  37y woman who is now post-operative day: 3    Subjective:  The patient feels well.  She is ambulating.   She is tolerating regular diet.  She denies nausea and vomiting.  She is voiding.  Her pain is controlled.  She reports normal postpartum bleeding.  She is breastfeeding.  She is formula feeding.    Physical exam:    Vital Signs Last 24 Hrs  T(C): 36.7 (2017 22:13), Max: 36.9 (2017 14:29)  T(F): 98 (2017 22:13), Max: 98.4 (2017 14:29)  HR: 101 (2017 22:13) (91 - 101)  BP: 120/72 (2017 22:13) (120/62 - 120/72)  BP(mean): --  RR: 18 (2017 22:13) (18 - 18)  SpO2: 99% (2017 22:13) (99% - 99%)    Gen: NAD  Breast: Soft, nontender, not engorged.  Abdomen: Soft, nontender, no distension , firm uterine fundus at umbilicus.  Incision: Clean, dry, and intact with steri strips  Pelvic: Normal lochia noted  Ext: No calf tenderness    LABS:      Rubella status:     Allergies    No Known Allergies    Intolerances      MEDICATIONS  (STANDING):  acetaminophen   Tablet 975 milliGRAM(s) Oral every 6 hours  diphtheria/tetanus/pertussis (acellular) Vaccine (ADAcel) 0.5 milliLiter(s) IntraMuscular once  ferrous    sulfate 325 milliGRAM(s) Oral daily  prenatal multivitamin 1 Tablet(s) Oral daily  heparin  Injectable 5000 Unit(s) SubCutaneous every 12 hours  ibuprofen  Tablet 600 milliGRAM(s) Oral every 6 hours  oxyCODONE    IR 5 milliGRAM(s) Oral every 3 hours    MEDICATIONS  (PRN):  simethicone 80 milliGRAM(s) Chew every 4 hours PRN Gas  diphenhydrAMINE   Capsule 25 milliGRAM(s) Oral every 6 hours PRN Itching  glycerin Suppository - Adult 1 Suppository(s) Rectal at bedtime PRN Constipation  docusate sodium 100 milliGRAM(s) Oral two times a day PRN Stool Softening  lanolin Ointment 1 Application(s) Topical every 3 hours PRN Sore Nipples  oxyCODONE    IR 5 milliGRAM(s) Oral every 4 hours PRN Severe Pain (7 - 10)  magnesium hydroxide Suspension 30 milliLiter(s) Oral daily PRN Constipation        Assessment and Plan  POD #   s/p  section  Doing well.  Encourage ambulation.  Continue routine post op care.

## 2017-07-30 NOTE — PROGRESS NOTE ADULT - SUBJECTIVE AND OBJECTIVE BOX
Patient seen and examined at bedside, no acute overnight events. No acute complaints, pain well controlled. Patient is ambulating, voiding spontaneously, passing flatus, and tolerating regular diet. Pt reports have a bowel movement.    Vital Signs Last 24 Hours  T(C): 36.7 (07-30-17 @ 06:00), Max: 36.9 (07-29-17 @ 14:29)  HR: 94 (07-30-17 @ 06:00) (91 - 101)  BP: 110/66 (07-30-17 @ 06:00) (110/66 - 120/72)  RR: 17 (07-30-17 @ 06:00) (17 - 18)  SpO2: 97% (07-30-17 @ 06:00) (97% - 99%)    Physical Exam:  General: NAD  Abdomen: Soft, non-tender, non-distended, fundus firm  Incision: Pfannenstiel incision CDI, subcuticular suture closure, steri strips in place  Pelvic: Lochia wnl    Labs:    Blood Type: O Positive  Rubella IgG: Positive (Positive=Immune, Negative=Non-Immune)  RPR: Negative               11.8   12.94 )-----------( 172      ( 07-28 @ 05:55 )             36.0                13.1   7.60  )-----------( 218      ( 07-26 @ 17:30 )             38.2         MEDICATIONS  (STANDING):  acetaminophen   Tablet 975 milliGRAM(s) Oral every 6 hours  diphtheria/tetanus/pertussis (acellular) Vaccine (ADAcel) 0.5 milliLiter(s) IntraMuscular once  ferrous    sulfate 325 milliGRAM(s) Oral daily  prenatal multivitamin 1 Tablet(s) Oral daily  heparin  Injectable 5000 Unit(s) SubCutaneous every 12 hours  ibuprofen  Tablet 600 milliGRAM(s) Oral every 6 hours  oxyCODONE    IR 5 milliGRAM(s) Oral every 3 hours    MEDICATIONS  (PRN):  simethicone 80 milliGRAM(s) Chew every 4 hours PRN Gas  diphenhydrAMINE   Capsule 25 milliGRAM(s) Oral every 6 hours PRN Itching  glycerin Suppository - Adult 1 Suppository(s) Rectal at bedtime PRN Constipation  docusate sodium 100 milliGRAM(s) Oral two times a day PRN Stool Softening  lanolin Ointment 1 Application(s) Topical every 3 hours PRN Sore Nipples  oxyCODONE    IR 5 milliGRAM(s) Oral every 4 hours PRN Severe Pain (7 - 10)  magnesium hydroxide Suspension 30 milliLiter(s) Oral daily PRN Constipation

## 2017-07-30 NOTE — PROGRESS NOTE ADULT - ASSESSMENT
37yoF  POD#3 s/p pLTCS for NRFHT. Pt is stable.    - Continue with po analgesia  - Increase ambulation  - Continue regular diet  - IV lock  - No labs

## 2017-07-30 NOTE — PROGRESS NOTE ADULT - ATTENDING COMMENTS
Pt stable for discharge home
Agree with above  Pt doing well pod 1 s/p LST CS  Continue supportive care

## 2018-12-21 ENCOUNTER — OUTPATIENT (OUTPATIENT)
Dept: OUTPATIENT SERVICES | Facility: HOSPITAL | Age: 38
LOS: 1 days | End: 2018-12-21

## 2018-12-21 VITALS — SYSTOLIC BLOOD PRESSURE: 111 MMHG | HEART RATE: 95 BPM | DIASTOLIC BLOOD PRESSURE: 60 MMHG

## 2018-12-21 DIAGNOSIS — O26.899 OTHER SPECIFIED PREGNANCY RELATED CONDITIONS, UNSPECIFIED TRIMESTER: ICD-10-CM

## 2018-12-21 DIAGNOSIS — Z3A.00 WEEKS OF GESTATION OF PREGNANCY NOT SPECIFIED: ICD-10-CM

## 2018-12-21 LAB
ALBUMIN SERPL ELPH-MCNC: 3.7 G/DL — SIGNIFICANT CHANGE UP (ref 3.3–5)
ALP SERPL-CCNC: 59 U/L — SIGNIFICANT CHANGE UP (ref 40–120)
ALT FLD-CCNC: 20 U/L — SIGNIFICANT CHANGE UP (ref 4–33)
AMYLASE P1 CFR SERPL: 83 U/L — SIGNIFICANT CHANGE UP (ref 25–125)
APPEARANCE UR: SIGNIFICANT CHANGE UP
AST SERPL-CCNC: 18 U/L — SIGNIFICANT CHANGE UP (ref 4–32)
BACTERIA # UR AUTO: SIGNIFICANT CHANGE UP
BILIRUB SERPL-MCNC: 0.2 MG/DL — SIGNIFICANT CHANGE UP (ref 0.2–1.2)
BILIRUB UR-MCNC: NEGATIVE — SIGNIFICANT CHANGE UP
BLOOD UR QL VISUAL: SIGNIFICANT CHANGE UP
BUN SERPL-MCNC: 6 MG/DL — LOW (ref 7–23)
CALCIUM SERPL-MCNC: 8.8 MG/DL — SIGNIFICANT CHANGE UP (ref 8.4–10.5)
CHLORIDE SERPL-SCNC: 103 MMOL/L — SIGNIFICANT CHANGE UP (ref 98–107)
CO2 SERPL-SCNC: 23 MMOL/L — SIGNIFICANT CHANGE UP (ref 22–31)
COLOR SPEC: YELLOW — SIGNIFICANT CHANGE UP
CREAT SERPL-MCNC: 0.56 MG/DL — SIGNIFICANT CHANGE UP (ref 0.5–1.3)
GLUCOSE SERPL-MCNC: 91 MG/DL — SIGNIFICANT CHANGE UP (ref 70–99)
GLUCOSE UR-MCNC: NEGATIVE — SIGNIFICANT CHANGE UP
HCT VFR BLD CALC: 36.3 % — SIGNIFICANT CHANGE UP (ref 34.5–45)
HGB BLD-MCNC: 11.8 G/DL — SIGNIFICANT CHANGE UP (ref 11.5–15.5)
HYALINE CASTS # UR AUTO: NEGATIVE — SIGNIFICANT CHANGE UP
KETONES UR-MCNC: HIGH
LEUKOCYTE ESTERASE UR-ACNC: NEGATIVE — SIGNIFICANT CHANGE UP
LIDOCAIN IGE QN: 20.5 U/L — SIGNIFICANT CHANGE UP (ref 7–60)
MCHC RBC-ENTMCNC: 27.3 PG — SIGNIFICANT CHANGE UP (ref 27–34)
MCHC RBC-ENTMCNC: 32.5 % — SIGNIFICANT CHANGE UP (ref 32–36)
MCV RBC AUTO: 84 FL — SIGNIFICANT CHANGE UP (ref 80–100)
NITRITE UR-MCNC: NEGATIVE — SIGNIFICANT CHANGE UP
NRBC # FLD: 0 — SIGNIFICANT CHANGE UP
PH UR: 6.5 — SIGNIFICANT CHANGE UP (ref 5–8)
PLATELET # BLD AUTO: 203 K/UL — SIGNIFICANT CHANGE UP (ref 150–400)
PMV BLD: 11.9 FL — SIGNIFICANT CHANGE UP (ref 7–13)
POTASSIUM SERPL-MCNC: 3.9 MMOL/L — SIGNIFICANT CHANGE UP (ref 3.5–5.3)
POTASSIUM SERPL-SCNC: 3.9 MMOL/L — SIGNIFICANT CHANGE UP (ref 3.5–5.3)
PROT SERPL-MCNC: 6.7 G/DL — SIGNIFICANT CHANGE UP (ref 6–8.3)
PROT UR-MCNC: 30 — SIGNIFICANT CHANGE UP
RBC # BLD: 4.32 M/UL — SIGNIFICANT CHANGE UP (ref 3.8–5.2)
RBC # FLD: 14 % — SIGNIFICANT CHANGE UP (ref 10.3–14.5)
RBC CASTS # UR COMP ASSIST: SIGNIFICANT CHANGE UP (ref 0–?)
SODIUM SERPL-SCNC: 138 MMOL/L — SIGNIFICANT CHANGE UP (ref 135–145)
SP GR SPEC: 1.02 — SIGNIFICANT CHANGE UP (ref 1–1.04)
SQUAMOUS # UR AUTO: SIGNIFICANT CHANGE UP
UROBILINOGEN FLD QL: NORMAL — SIGNIFICANT CHANGE UP
WBC # BLD: 7.87 K/UL — SIGNIFICANT CHANGE UP (ref 3.8–10.5)
WBC # FLD AUTO: 7.87 K/UL — SIGNIFICANT CHANGE UP (ref 3.8–10.5)
WBC UR QL: SIGNIFICANT CHANGE UP (ref 0–?)

## 2018-12-21 RX ORDER — METOCLOPRAMIDE HCL 10 MG
10 TABLET ORAL ONCE
Qty: 0 | Refills: 0 | Status: COMPLETED | OUTPATIENT
Start: 2018-12-21 | End: 2018-12-21

## 2018-12-21 RX ORDER — SODIUM CHLORIDE 9 MG/ML
1000 INJECTION, SOLUTION INTRAVENOUS ONCE
Qty: 0 | Refills: 0 | Status: COMPLETED | OUTPATIENT
Start: 2018-12-21 | End: 2018-12-21

## 2018-12-21 RX ADMIN — Medication 10 MILLIGRAM(S): at 09:59

## 2018-12-21 RX ADMIN — SODIUM CHLORIDE 2000 MILLILITER(S): 9 INJECTION, SOLUTION INTRAVENOUS at 09:50

## 2019-03-20 ENCOUNTER — OUTPATIENT (OUTPATIENT)
Dept: OUTPATIENT SERVICES | Facility: HOSPITAL | Age: 39
LOS: 1 days | Discharge: HOME | End: 2019-03-20
Payer: COMMERCIAL

## 2019-03-20 DIAGNOSIS — Z3A.00 WEEKS OF GESTATION OF PREGNANCY NOT SPECIFIED: ICD-10-CM

## 2019-03-20 DIAGNOSIS — O26.899 OTHER SPECIFIED PREGNANCY RELATED CONDITIONS, UNSPECIFIED TRIMESTER: ICD-10-CM

## 2019-03-20 LAB
APPEARANCE UR: CLEAR — SIGNIFICANT CHANGE UP
BILIRUB UR-MCNC: NEGATIVE — SIGNIFICANT CHANGE UP
BLOOD UR QL VISUAL: NEGATIVE — SIGNIFICANT CHANGE UP
COLOR SPEC: YELLOW — SIGNIFICANT CHANGE UP
EPI CELLS # UR: SIGNIFICANT CHANGE UP
GLUCOSE UR-MCNC: NEGATIVE — SIGNIFICANT CHANGE UP
KETONES UR-MCNC: NEGATIVE — SIGNIFICANT CHANGE UP
LEUKOCYTE ESTERASE UR-ACNC: NEGATIVE — SIGNIFICANT CHANGE UP
NITRITE UR-MCNC: NEGATIVE — SIGNIFICANT CHANGE UP
PH UR: 6.5 — SIGNIFICANT CHANGE UP (ref 5–8)
PROT UR-MCNC: NEGATIVE — SIGNIFICANT CHANGE UP
RBC CASTS # UR COMP ASSIST: SIGNIFICANT CHANGE UP (ref 0–?)
SP GR SPEC: 1.01 — SIGNIFICANT CHANGE UP (ref 1–1.04)
UROBILINOGEN FLD QL: NORMAL — SIGNIFICANT CHANGE UP
WBC UR QL: SIGNIFICANT CHANGE UP (ref 0–?)

## 2019-03-20 PROCEDURE — 59025 FETAL NON-STRESS TEST: CPT | Mod: 26

## 2019-04-10 ENCOUNTER — OUTPATIENT (OUTPATIENT)
Dept: INPATIENT UNIT | Facility: HOSPITAL | Age: 39
LOS: 1 days | Discharge: HOME | End: 2019-04-10

## 2019-04-10 VITALS — DIASTOLIC BLOOD PRESSURE: 58 MMHG | HEART RATE: 100 BPM | SYSTOLIC BLOOD PRESSURE: 103 MMHG | TEMPERATURE: 98 F

## 2019-04-10 DIAGNOSIS — Z98.891 HISTORY OF UTERINE SCAR FROM PREVIOUS SURGERY: Chronic | ICD-10-CM

## 2019-04-10 DIAGNOSIS — Z3A.00 WEEKS OF GESTATION OF PREGNANCY NOT SPECIFIED: ICD-10-CM

## 2019-04-10 DIAGNOSIS — O26.899 OTHER SPECIFIED PREGNANCY RELATED CONDITIONS, UNSPECIFIED TRIMESTER: ICD-10-CM

## 2019-04-10 LAB
APPEARANCE UR: SIGNIFICANT CHANGE UP
BACTERIA # UR AUTO: SIGNIFICANT CHANGE UP
BILIRUB UR-MCNC: NEGATIVE — SIGNIFICANT CHANGE UP
BLD GP AB SCN SERPL QL: NEGATIVE — SIGNIFICANT CHANGE UP
BLOOD UR QL VISUAL: NEGATIVE — SIGNIFICANT CHANGE UP
COLOR SPEC: YELLOW — SIGNIFICANT CHANGE UP
GLUCOSE UR-MCNC: NEGATIVE — SIGNIFICANT CHANGE UP
HCT VFR BLD CALC: 36 % — SIGNIFICANT CHANGE UP (ref 34.5–45)
HGB BLD-MCNC: 11.4 G/DL — LOW (ref 11.5–15.5)
HYALINE CASTS # UR AUTO: HIGH
KETONES UR-MCNC: NEGATIVE — SIGNIFICANT CHANGE UP
LEUKOCYTE ESTERASE UR-ACNC: SIGNIFICANT CHANGE UP
MCHC RBC-ENTMCNC: 27.2 PG — SIGNIFICANT CHANGE UP (ref 27–34)
MCHC RBC-ENTMCNC: 31.7 % — LOW (ref 32–36)
MCV RBC AUTO: 85.9 FL — SIGNIFICANT CHANGE UP (ref 80–100)
MUCOUS THREADS # UR AUTO: SIGNIFICANT CHANGE UP
NITRITE UR-MCNC: NEGATIVE — SIGNIFICANT CHANGE UP
NRBC # FLD: 0 K/UL — SIGNIFICANT CHANGE UP (ref 0–0)
PH UR: 6.5 — SIGNIFICANT CHANGE UP (ref 5–8)
PLATELET # BLD AUTO: 191 K/UL — SIGNIFICANT CHANGE UP (ref 150–400)
PMV BLD: 11.7 FL — SIGNIFICANT CHANGE UP (ref 7–13)
PROT UR-MCNC: 20 — SIGNIFICANT CHANGE UP
RBC # BLD: 4.19 M/UL — SIGNIFICANT CHANGE UP (ref 3.8–5.2)
RBC # FLD: 14.3 % — SIGNIFICANT CHANGE UP (ref 10.3–14.5)
RBC CASTS # UR COMP ASSIST: HIGH (ref 0–?)
RH IG SCN BLD-IMP: POSITIVE — SIGNIFICANT CHANGE UP
SP GR SPEC: 1.02 — SIGNIFICANT CHANGE UP (ref 1–1.04)
SQUAMOUS # UR AUTO: SIGNIFICANT CHANGE UP
UROBILINOGEN FLD QL: NORMAL — SIGNIFICANT CHANGE UP
WBC # BLD: 8.33 K/UL — SIGNIFICANT CHANGE UP (ref 3.8–10.5)
WBC # FLD AUTO: 8.33 K/UL — SIGNIFICANT CHANGE UP (ref 3.8–10.5)
WBC UR QL: HIGH (ref 0–?)
YEAST BUDDING # UR COMP ASSIST: SIGNIFICANT CHANGE UP

## 2019-04-10 RX ORDER — SODIUM CHLORIDE 9 MG/ML
1000 INJECTION, SOLUTION INTRAVENOUS ONCE
Qty: 0 | Refills: 0 | Status: COMPLETED | OUTPATIENT
Start: 2019-04-10 | End: 2019-04-10

## 2019-04-10 RX ORDER — ACETAMINOPHEN 500 MG
1000 TABLET ORAL ONCE
Qty: 0 | Refills: 0 | Status: COMPLETED | OUTPATIENT
Start: 2019-04-10 | End: 2019-04-10

## 2019-04-10 RX ADMIN — Medication 12 MILLIGRAM(S): at 18:38

## 2019-04-10 RX ADMIN — SODIUM CHLORIDE 2000 MILLILITER(S): 9 INJECTION, SOLUTION INTRAVENOUS at 14:30

## 2019-04-10 RX ADMIN — SODIUM CHLORIDE 2000 MILLILITER(S): 9 INJECTION, SOLUTION INTRAVENOUS at 17:20

## 2019-04-10 RX ADMIN — SODIUM CHLORIDE 2000 MILLILITER(S): 9 INJECTION, SOLUTION INTRAVENOUS at 21:32

## 2019-04-10 NOTE — OB RN TRIAGE NOTE - NS_TRIAGEADDITIONAL COMMENTS_OBGYN_ALL_OB_FT
FH 175bpm in [facility]@1141, no triage room available, charge RN/ANM/triage providers made aware   in [facility] @ 1225, ANM aware -FH 175bpm in [facility]@1141, no triage room available, charge RN/ANM/triage providers made aware  - in [facility] @ 1225, ANM aware  -10 minute FH strip obtained in [facility] +FM audible,  mod. variability, + accels, no decels

## 2019-04-10 NOTE — CHART NOTE - NSCHARTNOTEFT_GEN_A_CORE
Patient re-examined for cervical change.     SVE - 0.5/0/-3  EFM - 135/mod/+accels/-deccels  Volant - ctx q6 mins    FHT CAT I. Ctx spaced out. Patient is comfortable. Patient to be discharged home.    D/w Dr. Bandar Pitts PGY-1

## 2019-04-10 NOTE — OB PROVIDER TRIAGE NOTE - HISTORY OF PRESENT ILLNESS
39 year old female P at  35.3 wks gestation who present  with back pain and abd pains   denied any rom lof vb feels gfm    stated prior CS 2017 and will be for repeat CS on 5/12/19. 39 year old female P at  35.3 wks gestation who present  with back pain and abd pains   denied any rom lof vb feels gfm    stated prior CS 2017 and will be for repeat CS on 5/7/19.

## 2019-04-10 NOTE — OB PROVIDER TRIAGE NOTE - NSOBPROVIDERNOTE_OBGYN_ALL_OB_FT
39 year old female at 35.3 wks  prior CS x1 for Repeat CS ( declined TOLAC )    who presents with   contrxs and back pains      1530p IVh started   for recheck at 1730p   nst raective with irreg contrxs q2-6 min    ve closed at 1400p     1745p    seen with Dr White    nst reviewed  reactive with irreg contrxs q 6-10 min    receiving 2 nd liter of IVH    repeat VE unchanged closed cx    case d/w Dr Aguirre     will repeat VE at 2030p    will start Betamethasone injections    UA negative     continued observation and monitoring

## 2019-04-10 NOTE — CHART NOTE - NSCHARTNOTEFT_GEN_A_CORE
nst reviewed  reactive with irreg contrxs q 6-10 min    receiving 2 nd liter of IVH    repeat VE unchanged closed cx    case d/w Dr Aguirre     will repeat VE at 2030p    will start Betamethasone injections    UA negative     continued observation and monitoring  Edilson HEWITT

## 2019-04-10 NOTE — CHART NOTE - NSCHARTNOTEFT_GEN_A_CORE
R3 ob note    Pt examined at bedside to assess cervical change. Pt states that her ctx are less intense, 5/10. Denies abdominal pain or incisional pain. Desires to go home.    O:   T(C): 36.6 (20:04)  HR: 98 (20:04)  BP: 107/61 (20:04)  RR: 18 (18:40)  SpO2: 99% (11:40)    Abd: soft, gravid, nt, no incisional tenderness  SVE: 0.5/long/-3  EFM: 150/modvar/+acc/-dec  Robert Lee: q4min    Plan: will hydrate pt at this time and recheck in 1 hour    Tracy, PGY-3  d/w DR. wayne

## 2019-04-11 LAB — T PALLIDUM AB TITR SER: NEGATIVE — SIGNIFICANT CHANGE UP

## 2019-04-11 NOTE — DISCHARGE NOTE OB - HOSPITAL COURSE
Patient admitted for  contractions. Patient is s/p one dose of betamethasone and IVF. Patient made change from closed to fingertip, however remained at fingertip and long after serial vaginal exams. Contractions spaced out. Patient feels better. She will be discharged home with  labor precautions. She will follow-up within the week with Dr. Aguirre.

## 2019-04-11 NOTE — DISCHARGE NOTE OB - CARE PROVIDER_API CALL
Amber Aguirre)  Obstetrics and Gynecology  [address] [place] [address]  Phone: [phone]  Fax: [phone]  Follow Up Time: 1 week

## 2019-04-11 NOTE — DISCHARGE NOTE OB - MEDICATION SUMMARY - MEDICATIONS TO STOP TAKING
I will STOP taking the medications listed below when I get home from the hospital:    acetaminophen 325 mg oral tablet  -- 3 tab(s) by mouth every 6 hours    acetaminophen-oxycodone 325 mg-5 mg oral tablet  -- 1 tab(s) by mouth every 6 hours, As Needed -for moderate pain MDD:4  -- Caution federal law prohibits the transfer of this drug to any person other  than the person for whom it was prescribed.  May cause drowsiness.  Alcohol may intensify this effect.  Use care when operating dangerous machinery.  This prescription cannot be refilled.  This product contains acetaminophen.  Do not use  with any other product containing acetaminophen to prevent possible liver damage.  Using more of this medication than prescribed may cause serious breathing problems.

## 2019-04-11 NOTE — DISCHARGE NOTE OB - MEDICATION SUMMARY - MEDICATIONS TO TAKE
I will START or STAY ON the medications listed below when I get home from the hospital:    Prenatal Multivitamins with Folic Acid 1 mg oral tablet  -- 1 tab(s) by mouth once a day  -- Indication: For Home medication

## 2019-04-11 NOTE — DISCHARGE NOTE OB - CARE PLAN
Principal Discharge DX:	 contractions  Goal:	Recovery  Assessment and plan of treatment:	Please follow-up with your OB provider within the week. Call your Doctor or come to the hospital if you are feeling contractions every 5 minutes for at least 2 hours, you experience vaginal bleeding, or you break your water.

## 2019-04-11 NOTE — DISCHARGE NOTE OB - PATIENT PORTAL LINK FT
You can access the [facility] Patient Portal, offered by [facility], by registering with the following website: [email or web address]

## 2019-04-11 NOTE — DISCHARGE NOTE OB - PLAN OF CARE
Recovery Please follow-up with your OB provider within the week. Call your Doctor or come to the hospital if you are feeling contractions every 5 minutes for at least 2 hours, you experience vaginal bleeding, or you break your water.

## 2019-04-12 LAB
BACTERIA UR CULT: SIGNIFICANT CHANGE UP
SPECIMEN SOURCE: SIGNIFICANT CHANGE UP

## 2019-04-24 ENCOUNTER — OUTPATIENT (OUTPATIENT)
Dept: OUTPATIENT SERVICES | Facility: HOSPITAL | Age: 39
LOS: 1 days | End: 2019-04-24

## 2019-04-24 VITALS
WEIGHT: 199.96 LBS | HEART RATE: 90 BPM | TEMPERATURE: 98 F | RESPIRATION RATE: 16 BRPM | OXYGEN SATURATION: 98 % | DIASTOLIC BLOOD PRESSURE: 68 MMHG | SYSTOLIC BLOOD PRESSURE: 110 MMHG | HEIGHT: 65 IN

## 2019-04-24 DIAGNOSIS — O34.29 MATERNAL CARE DUE TO UTERINE SCAR FROM OTHER PREVIOUS SURGERY: ICD-10-CM

## 2019-04-24 DIAGNOSIS — Z98.891 HISTORY OF UTERINE SCAR FROM PREVIOUS SURGERY: Chronic | ICD-10-CM

## 2019-04-24 LAB
APPEARANCE UR: SIGNIFICANT CHANGE UP
BACTERIA # UR AUTO: SIGNIFICANT CHANGE UP
BILIRUB UR-MCNC: NEGATIVE — SIGNIFICANT CHANGE UP
BLD GP AB SCN SERPL QL: NEGATIVE — SIGNIFICANT CHANGE UP
BLOOD UR QL VISUAL: NEGATIVE — SIGNIFICANT CHANGE UP
COLOR SPEC: SIGNIFICANT CHANGE UP
EPI CELLS # UR: SIGNIFICANT CHANGE UP
GLUCOSE UR-MCNC: NEGATIVE — SIGNIFICANT CHANGE UP
HCT VFR BLD CALC: 35.3 % — SIGNIFICANT CHANGE UP (ref 34.5–45)
HGB BLD-MCNC: 11 G/DL — LOW (ref 11.5–15.5)
HYALINE CASTS # UR AUTO: NEGATIVE — SIGNIFICANT CHANGE UP
KETONES UR-MCNC: NEGATIVE — SIGNIFICANT CHANGE UP
LEUKOCYTE ESTERASE UR-ACNC: NEGATIVE — SIGNIFICANT CHANGE UP
MCHC RBC-ENTMCNC: 27.4 PG — SIGNIFICANT CHANGE UP (ref 27–34)
MCHC RBC-ENTMCNC: 31.2 % — LOW (ref 32–36)
MCV RBC AUTO: 87.8 FL — SIGNIFICANT CHANGE UP (ref 80–100)
NITRITE UR-MCNC: NEGATIVE — SIGNIFICANT CHANGE UP
NRBC # FLD: 0 K/UL — SIGNIFICANT CHANGE UP (ref 0–0)
PH UR: 6.5 — SIGNIFICANT CHANGE UP (ref 5–8)
PLATELET # BLD AUTO: 204 K/UL — SIGNIFICANT CHANGE UP (ref 150–400)
PMV BLD: 11.9 FL — SIGNIFICANT CHANGE UP (ref 7–13)
PROT UR-MCNC: 10 — SIGNIFICANT CHANGE UP
RBC # BLD: 4.02 M/UL — SIGNIFICANT CHANGE UP (ref 3.8–5.2)
RBC # FLD: 14.3 % — SIGNIFICANT CHANGE UP (ref 10.3–14.5)
RBC CASTS # UR COMP ASSIST: SIGNIFICANT CHANGE UP (ref 0–?)
RH IG SCN BLD-IMP: POSITIVE — SIGNIFICANT CHANGE UP
SP GR SPEC: 1.01 — SIGNIFICANT CHANGE UP (ref 1–1.04)
SQUAMOUS # UR AUTO: SIGNIFICANT CHANGE UP
T PALLIDUM AB TITR SER: NEGATIVE — SIGNIFICANT CHANGE UP
UROBILINOGEN FLD QL: NORMAL — SIGNIFICANT CHANGE UP
WBC # BLD: 6.89 K/UL — SIGNIFICANT CHANGE UP (ref 3.8–10.5)
WBC # FLD AUTO: 6.89 K/UL — SIGNIFICANT CHANGE UP (ref 3.8–10.5)
WBC UR QL: HIGH (ref 0–?)
YEAST FLD HPF-#/AREA: SIGNIFICANT CHANGE UP

## 2019-04-24 RX ORDER — OMEGA-3 ACID ETHYL ESTERS 1 G
1 CAPSULE ORAL
Qty: 0 | Refills: 0 | COMMUNITY

## 2019-04-24 NOTE — OB PST NOTE - NSHPPHYSICALEXAM_GEN_ALL_CORE

## 2019-04-24 NOTE — OB PST NOTE - NSHPREVIEWOFSYSTEMS_GEN_ALL_CORE

## 2019-04-24 NOTE — OB PST NOTE - NSANTHOSAYNRD_GEN_A_CORE
No. ANURAG screening performed.  STOP BANG Legend: 0-2 = LOW Risk; 3-4 = INTERMEDIATE Risk; 5-8 = HIGH Risk

## 2019-04-24 NOTE — OB PST NOTE - NS_OBGYNHISTORY_OBGYN_ALL_OB_FT
genital herpes     2017  full term boy due to "low heart rate"  vaginal delivery 2004 full term girl

## 2019-04-24 NOTE — OB PST NOTE - HISTORY OF PRESENT ILLNESS
39 year old female 37 weeks 3 days gestation (as per GYN sheet) presents to presurgical testing for evaluation for scheduled repeat  and bilateral tubal ligation for 19. Pt denies complications during pregnancy. Pt reports positive fetal movement. Pt denies abnormal vaginal bleeding, contractions, or leakage of amniotic fluid.

## 2019-04-24 NOTE — OB PST NOTE - PROBLEM SELECTOR PLAN 1
Pt is scheduled for repeat  and bilateral tubal ligation for 19. Pre-op instructions provided. Chlorhexidine soap provided for skin prep. Pt stated understanding. Instructed to bring consent for tubal ligation on day of procedure.

## 2019-05-07 ENCOUNTER — INPATIENT (INPATIENT)
Facility: HOSPITAL | Age: 39
LOS: 2 days | Discharge: HOME | End: 2019-05-10
Attending: OBSTETRICS & GYNECOLOGY | Admitting: OBSTETRICS & GYNECOLOGY
Payer: COMMERCIAL

## 2019-05-07 VITALS — DIASTOLIC BLOOD PRESSURE: 59 MMHG | SYSTOLIC BLOOD PRESSURE: 88 MMHG | HEART RATE: 92 BPM

## 2019-05-07 DIAGNOSIS — O34.29 MATERNAL CARE DUE TO UTERINE SCAR FROM OTHER PREVIOUS SURGERY: ICD-10-CM

## 2019-05-07 DIAGNOSIS — B00.9 HERPESVIRAL INFECTION, UNSPECIFIED: ICD-10-CM

## 2019-05-07 DIAGNOSIS — L91.0 HYPERTROPHIC SCAR: ICD-10-CM

## 2019-05-07 DIAGNOSIS — Z98.891 HISTORY OF UTERINE SCAR FROM PREVIOUS SURGERY: Chronic | ICD-10-CM

## 2019-05-07 DIAGNOSIS — Z98.891 HISTORY OF UTERINE SCAR FROM PREVIOUS SURGERY: ICD-10-CM

## 2019-05-07 LAB
BLD GP AB SCN SERPL QL: NEGATIVE — SIGNIFICANT CHANGE UP
RH IG SCN BLD-IMP: POSITIVE — SIGNIFICANT CHANGE UP

## 2019-05-07 PROCEDURE — 88302 TISSUE EXAM BY PATHOLOGIST: CPT | Mod: 26

## 2019-05-07 RX ORDER — KETOROLAC TROMETHAMINE 30 MG/ML
30 SYRINGE (ML) INJECTION EVERY 6 HOURS
Qty: 0 | Refills: 0 | Status: DISCONTINUED | OUTPATIENT
Start: 2019-05-07 | End: 2019-05-08

## 2019-05-07 RX ORDER — MAGNESIUM HYDROXIDE 400 MG/1
30 TABLET, CHEWABLE ORAL
Qty: 0 | Refills: 0 | Status: DISCONTINUED | OUTPATIENT
Start: 2019-05-07 | End: 2019-05-10

## 2019-05-07 RX ORDER — SIMETHICONE 80 MG/1
80 TABLET, CHEWABLE ORAL EVERY 4 HOURS
Qty: 0 | Refills: 0 | Status: DISCONTINUED | OUTPATIENT
Start: 2019-05-07 | End: 2019-05-10

## 2019-05-07 RX ORDER — TRIAMCINOLONE 4 MG
10 TABLET ORAL ONCE
Qty: 0 | Refills: 0 | Status: DISCONTINUED | OUTPATIENT
Start: 2019-05-07 | End: 2019-05-08

## 2019-05-07 RX ORDER — NALOXONE HYDROCHLORIDE 4 MG/.1ML
0.1 SPRAY NASAL
Qty: 0 | Refills: 0 | Status: DISCONTINUED | OUTPATIENT
Start: 2019-05-08 | End: 2019-05-08

## 2019-05-07 RX ORDER — ONDANSETRON 8 MG/1
4 TABLET, FILM COATED ORAL EVERY 6 HOURS
Qty: 0 | Refills: 0 | Status: DISCONTINUED | OUTPATIENT
Start: 2019-05-07 | End: 2019-05-09

## 2019-05-07 RX ORDER — OXYCODONE HYDROCHLORIDE 5 MG/1
10 TABLET ORAL
Qty: 0 | Refills: 0 | Status: DISCONTINUED | OUTPATIENT
Start: 2019-05-08 | End: 2019-05-08

## 2019-05-07 RX ORDER — SODIUM CHLORIDE 9 MG/ML
1000 INJECTION, SOLUTION INTRAVENOUS
Qty: 0 | Refills: 0 | Status: DISCONTINUED | OUTPATIENT
Start: 2019-05-07 | End: 2019-05-07

## 2019-05-07 RX ORDER — GLYCERIN ADULT
1 SUPPOSITORY, RECTAL RECTAL AT BEDTIME
Qty: 0 | Refills: 0 | Status: COMPLETED | OUTPATIENT
Start: 2019-05-07 | End: 2019-05-09

## 2019-05-07 RX ORDER — SODIUM CHLORIDE 9 MG/ML
1000 INJECTION, SOLUTION INTRAVENOUS ONCE
Qty: 0 | Refills: 0 | Status: COMPLETED | OUTPATIENT
Start: 2019-05-07 | End: 2019-05-07

## 2019-05-07 RX ORDER — OXYCODONE HYDROCHLORIDE 5 MG/1
5 TABLET ORAL
Qty: 0 | Refills: 0 | Status: DISCONTINUED | OUTPATIENT
Start: 2019-05-08 | End: 2019-05-08

## 2019-05-07 RX ORDER — DOCUSATE SODIUM 100 MG
100 CAPSULE ORAL
Qty: 0 | Refills: 0 | Status: DISCONTINUED | OUTPATIENT
Start: 2019-05-07 | End: 2019-05-10

## 2019-05-07 RX ORDER — OXYCODONE HYDROCHLORIDE 5 MG/1
5 TABLET ORAL
Qty: 0 | Refills: 0 | Status: COMPLETED | OUTPATIENT
Start: 2019-05-07 | End: 2019-05-14

## 2019-05-07 RX ORDER — ACETAMINOPHEN 500 MG
975 TABLET ORAL EVERY 6 HOURS
Qty: 0 | Refills: 0 | Status: DISCONTINUED | OUTPATIENT
Start: 2019-05-07 | End: 2019-05-10

## 2019-05-07 RX ORDER — METOCLOPRAMIDE HCL 10 MG
10 TABLET ORAL ONCE
Qty: 0 | Refills: 0 | Status: COMPLETED | OUTPATIENT
Start: 2019-05-07 | End: 2019-05-07

## 2019-05-07 RX ORDER — SODIUM CHLORIDE 9 MG/ML
1000 INJECTION, SOLUTION INTRAVENOUS
Qty: 0 | Refills: 0 | Status: DISCONTINUED | OUTPATIENT
Start: 2019-05-07 | End: 2019-05-08

## 2019-05-07 RX ORDER — CITRIC ACID/SODIUM CITRATE 300-500 MG
30 SOLUTION, ORAL ORAL ONCE
Qty: 0 | Refills: 0 | Status: COMPLETED | OUTPATIENT
Start: 2019-05-07 | End: 2019-05-07

## 2019-05-07 RX ORDER — HEPARIN SODIUM 5000 [USP'U]/ML
5000 INJECTION INTRAVENOUS; SUBCUTANEOUS EVERY 12 HOURS
Qty: 0 | Refills: 0 | Status: DISCONTINUED | OUTPATIENT
Start: 2019-05-07 | End: 2019-05-10

## 2019-05-07 RX ORDER — TETANUS TOXOID, REDUCED DIPHTHERIA TOXOID AND ACELLULAR PERTUSSIS VACCINE, ADSORBED 5; 2.5; 8; 8; 2.5 [IU]/.5ML; [IU]/.5ML; UG/.5ML; UG/.5ML; UG/.5ML
0.5 SUSPENSION INTRAMUSCULAR ONCE
Qty: 0 | Refills: 0 | Status: DISCONTINUED | OUTPATIENT
Start: 2019-05-07 | End: 2019-05-10

## 2019-05-07 RX ORDER — DIPHENHYDRAMINE HCL 50 MG
25 CAPSULE ORAL EVERY 6 HOURS
Qty: 0 | Refills: 0 | Status: DISCONTINUED | OUTPATIENT
Start: 2019-05-07 | End: 2019-05-10

## 2019-05-07 RX ORDER — FAMOTIDINE 10 MG/ML
20 INJECTION INTRAVENOUS ONCE
Qty: 0 | Refills: 0 | Status: COMPLETED | OUTPATIENT
Start: 2019-05-07 | End: 2019-05-07

## 2019-05-07 RX ORDER — IBUPROFEN 200 MG
600 TABLET ORAL EVERY 6 HOURS
Qty: 0 | Refills: 0 | Status: COMPLETED | OUTPATIENT
Start: 2019-05-07 | End: 2020-04-04

## 2019-05-07 RX ORDER — OXYTOCIN 10 UNIT/ML
333.33 VIAL (ML) INJECTION
Qty: 20 | Refills: 0 | Status: DISCONTINUED | OUTPATIENT
Start: 2019-05-07 | End: 2019-05-08

## 2019-05-07 RX ORDER — OXYCODONE HYDROCHLORIDE 5 MG/1
5 TABLET ORAL ONCE
Qty: 0 | Refills: 0 | Status: DISCONTINUED | OUTPATIENT
Start: 2019-05-07 | End: 2019-05-10

## 2019-05-07 RX ORDER — OXYTOCIN 10 UNIT/ML
333.33 VIAL (ML) INJECTION
Qty: 20 | Refills: 0 | Status: DISCONTINUED | OUTPATIENT
Start: 2019-05-07 | End: 2019-05-07

## 2019-05-07 RX ORDER — HYDROMORPHONE HYDROCHLORIDE 2 MG/ML
1 INJECTION INTRAMUSCULAR; INTRAVENOUS; SUBCUTANEOUS
Qty: 0 | Refills: 0 | Status: DISCONTINUED | OUTPATIENT
Start: 2019-05-08 | End: 2019-05-08

## 2019-05-07 RX ORDER — LANOLIN
1 OINTMENT (GRAM) TOPICAL EVERY 6 HOURS
Qty: 0 | Refills: 0 | Status: DISCONTINUED | OUTPATIENT
Start: 2019-05-07 | End: 2019-05-10

## 2019-05-07 RX ADMIN — SODIUM CHLORIDE 75 MILLILITER(S): 9 INJECTION, SOLUTION INTRAVENOUS at 14:16

## 2019-05-07 RX ADMIN — Medication 30 MILLIGRAM(S): at 23:45

## 2019-05-07 RX ADMIN — Medication 30 MILLIGRAM(S): at 17:57

## 2019-05-07 RX ADMIN — HEPARIN SODIUM 5000 UNIT(S): 5000 INJECTION INTRAVENOUS; SUBCUTANEOUS at 17:57

## 2019-05-07 RX ADMIN — Medication 30 MILLIGRAM(S): at 18:13

## 2019-05-07 RX ADMIN — SODIUM CHLORIDE 75 MILLILITER(S): 9 INJECTION, SOLUTION INTRAVENOUS at 14:17

## 2019-05-07 RX ADMIN — Medication 10 MILLIGRAM(S): at 09:27

## 2019-05-07 RX ADMIN — Medication 1000 MILLIUNIT(S)/MIN: at 14:17

## 2019-05-07 RX ADMIN — SODIUM CHLORIDE 2000 MILLILITER(S): 9 INJECTION, SOLUTION INTRAVENOUS at 09:28

## 2019-05-07 RX ADMIN — SODIUM CHLORIDE 200 MILLILITER(S): 9 INJECTION, SOLUTION INTRAVENOUS at 09:28

## 2019-05-07 RX ADMIN — FAMOTIDINE 20 MILLIGRAM(S): 10 INJECTION INTRAVENOUS at 09:27

## 2019-05-07 RX ADMIN — ONDANSETRON 4 MILLIGRAM(S): 8 TABLET, FILM COATED ORAL at 14:34

## 2019-05-07 RX ADMIN — Medication 30 MILLILITER(S): at 09:29

## 2019-05-07 NOTE — OB PROVIDER H&P - NSHPPHYSICALEXAM_GEN_ALL_CORE
HR: 90 (05-07-19 @ 08:15) (90 - 92)  BP: 135/66 (05-07-19 @ 08:15) (88/59 - 135/66)    Heart: RRR, S1&S2, no S3  Lungs: Clear bilateral to auscultation, good inspiratory /expiratory effort              no rhonchi, no rales  Abd: Gravid, + transverse keloid scar  EFM:  145 bpm/moderate variabilty/+accelerations/no decelerations/CAT 1  TOCO:  no UC  Ext: FROM, minimal edema

## 2019-05-07 NOTE — OB PROVIDER H&P - HISTORY OF PRESENT ILLNESS
40yo female  EDC/  presents@ 39.2 wks for braden rltcs and BTL  AP course otherwise unremarkable. +PMHx HSV(last outbreak few mos ago) on daily Valtrex  Denies VB,ROM or UCs today.  +FM

## 2019-05-07 NOTE — OB PROVIDER H&P - ASSESSMENT
Admit to L&D  braden rLTCS & BTL  NPO  routine labs  EFM/TOCO  anesthesia consult  Olga Briceño PAC  05-07-19 @ 08:38

## 2019-05-07 NOTE — OB RN DELIVERY SUMMARY - AMNIOTIC FLUID AMOUNT, LABOR
Department of Psychiatry  Attending Progress Note - Geriatric      SUBJECTIVE:    The patient is a 19-year-old  female, who  presents again at the [facility] Emergency Department with suicidal  ideation and intent to cut wrist with knife, in the context of chronic treatment resistant depression. Patient has been seen in my office. She stated that she is doing slightly better and her mood is \"still anxious and depressed\" today. Patient endorses good appetite and mildly improved quality of sleep. She stated that during this admission, she is not on medications which have been prescribed for her during the previous admissions, and gave her good quality of sleep. She is compliant with currently prescribed medications, denies any side effects, reported beneficial effect psychotropic medications for her mood. She is social with medical staff and other patients on the unit. Patient attends and participates in group activities, found them helpful and beneficial in her recovery. Patient did not perform her ADLs today. Currently, she denies any active suicidal or homicidal ideations, denies any plans. She also denies any auditory and visual hallucinations. OBJECTIVE    Physical  VITALS:  BP (!) 141/94   Pulse 90   Temp 96.9 °F (36.1 °C)   Resp 18   Ht 5' 5\" (1.651 m)   Wt 165 lb (74.8 kg)   SpO2 98%   BMI 27.46 kg/m²   TEMPERATURE:  Current - Temp: 96.9 °F (36.1 °C);  Max - Temp  Av.5 °F (36.4 °C)  Min: 96.9 °F (36.1 °C)  Max: 98.1 °F (36.7 °C)  RESPIRATIONS RANGE: Resp  Av  Min: 16  Max: 18  PULSE RANGE: Pulse  Av.5  Min: 73  Max: 90  BLOOD PRESSURE RANGE:  Systolic (24KIZ), EIS:224 , Min:130 , EIM:408   ; Diastolic (92IWZ), DQH:09, Min:86, Max:94    Review of Systems: 14 point review of systems is negative    Psychological ROS: positive for - anxiety and depression, only mildly improved quality of sleep     Mental Status Examination:    Appearance: Appropriately groomed, wearing casual civilian clothes. Made good eye contact. Behavior: Anxious, cooperative. No psychomotor retardation/agitation appreciated. Uses walker for ambulation. Speech: Normal in tone, volume, and quality. No slurring, dysarthria or   pressured speech noted. Mood: \"still anxious and depressed\"   Affect: Mood congruent. Range is full. Thought Process: Appears linear and goal oriented. Thought Content: Patient does not have any current active suicidal and   homicidal ideations. Perceptions: Seems patient does not have any auditory or visual hallucinations at present time. Patient did not appear to be responding to internal stimuli. Orientation: to person, place and situation. Alert. Language: Intact. Fund of information: Intact. Memory: recent and remote appear intact. Impulsivity: Limited. Neurovegitative: Fair appetite, mildly improved sleep. Insight: Impaired. Judgment: Impaired.       Data  Glucose level - 209    Medications  Current Facility-Administered Medications: insulin glargine (LANTUS) injection vial 20 Units, 20 Units, Subcutaneous, Nightly  insulin lispro (HUMALOG) injection vial 4 Units, 4 Units, Subcutaneous, TID WC  acetaminophen (TYLENOL) tablet 650 mg, 650 mg, Oral, Q4H PRN  magnesium hydroxide (MILK OF MAGNESIA) 400 MG/5ML suspension 30 mL, 30 mL, Oral, Daily PRN  nicotine (NICODERM CQ) 14 MG/24HR 1 patch, 1 patch, Transdermal, Daily  insulin lispro (HUMALOG) injection vial 0-6 Units, 0-6 Units, Subcutaneous, TID WC  insulin lispro (HUMALOG) injection vial 0-3 Units, 0-3 Units, Subcutaneous, Nightly  glucose (GLUTOSE) 40 % oral gel 15 g, 15 g, Oral, PRN  dextrose 50 % solution 12.5 g, 12.5 g, Intravenous, PRN  glucagon (rDNA) injection 1 mg, 1 mg, Intramuscular, PRN  dextrose 5 % solution, 100 mL/hr, Intravenous, PRN  nitroGLYCERIN (NITROSTAT) SL tablet 0.4 mg, 0.4 mg, Sublingual, Q5 Min PRN  gabapentin (NEURONTIN) capsule 300 mg, 300 mg, Oral, TID  doxepin (SINEQUAN) capsule 25 mg, 25 mg, Oral, Nightly  melatonin tablet 6 mg, 6 mg, Oral, Nightly PRN  levothyroxine (SYNTHROID) tablet 75 mcg, 75 mcg, Oral, Daily  pantoprazole (PROTONIX) tablet 40 mg, 40 mg, Oral, QAM AC    ASSESSMENT AND PLAN    DSM 5 DIAGNOSIS:  Major depressive disorder, recurrent, severe without psychotic features  Generalized anxiety disorder      Plan:   1. Psychiatric Medications:   Continue current psychotropic medications as recommended. Will restart Cymbalta 60 mg for depression, anxiety and chronic pain. Will increase dose of the doxepin from 25 mg to 50 mg to improve quality of sleep. The risks, benefits, side effects, indications, contraindications, alternatives and adverse effects of the medications have been discussed with patient. 2. Medical Issues:    Continue medical monitoring by Dr. James Larkin and associates. 3. Disposition:    Discharge patient home when she will be in stable mental condition and adjustment psychotropic medications will be done.      Amount of time spent with patient:    35 minutes with greater than 50% of the time spent in counseling and collaboration of care within normal limits

## 2019-05-07 NOTE — OB NEONATOLOGY/PEDIATRICIAN DELIVERY SUMMARY - NSPEDSNEONOTESA_OBGYN_ALL_OB_FT
__39 2/7__ wk female born to a _39__ y/o  mother via repeat vacuum assisted CS. Maternal history not significant. Pregnancy uncomplicated. Maternal blood type _O+_. Prenatal labs HIV/ Hep B negative, RPR non-reactive and Rubella immune. GBS negative on __4/2 ___. ROM at incision, clear fluids. Baby was born vigorous and crying spontaneously. W/D/S/S. APGARS 9/9. EOS N/A Mom is planning on breast/formula feeding, wants hep B vaccination prior to discharge.

## 2019-05-07 NOTE — OB RN PREOPERATIVE CHECKLIST - NSTRANFUSIONOBJECTION_GEN_ALL_CORE_SIUH
Consent: Written consent obtained. Risks include but not limited to bruising, beading, irregular texture, ulceration, infection, allergic reaction, scar formation, incomplete augmentation, temporary nature, procedural pain. Patient has no objection to blood transfusions.

## 2019-05-07 NOTE — OB PROVIDER DELIVERY SUMMARY - NSPROVIDERDELIVERYNOTE_OBGYN_ALL_OB_FT
Delivered via lst CS facilitated by [facility] vacuum x 1 pull liveborn female infant apgar 9/9.. Lysis of adhesions performed.  Delayed cord clamping performed.  Cord for gas obtained,  Placenta spontaneously  and delivered intact.  Uterus firm.  Bilateral salpingectomy performed. Abdomen closed in layers..  Dermabond for skin.  Mother and baby to PACU in stable condition.

## 2019-05-08 LAB
BASOPHILS # BLD AUTO: 0.02 K/UL — SIGNIFICANT CHANGE UP (ref 0–0.2)
BASOPHILS NFR BLD AUTO: 0.2 % — SIGNIFICANT CHANGE UP (ref 0–2)
EOSINOPHIL # BLD AUTO: 0.04 K/UL — SIGNIFICANT CHANGE UP (ref 0–0.5)
EOSINOPHIL NFR BLD AUTO: 0.3 % — SIGNIFICANT CHANGE UP (ref 0–6)
HCT VFR BLD CALC: 32.2 % — LOW (ref 34.5–45)
HGB BLD-MCNC: 10.4 G/DL — LOW (ref 11.5–15.5)
IMM GRANULOCYTES NFR BLD AUTO: 0.7 % — SIGNIFICANT CHANGE UP (ref 0–1.5)
LYMPHOCYTES # BLD AUTO: 1.34 K/UL — SIGNIFICANT CHANGE UP (ref 1–3.3)
LYMPHOCYTES # BLD AUTO: 10.1 % — LOW (ref 13–44)
MCHC RBC-ENTMCNC: 27.9 PG — SIGNIFICANT CHANGE UP (ref 27–34)
MCHC RBC-ENTMCNC: 32.3 % — SIGNIFICANT CHANGE UP (ref 32–36)
MCV RBC AUTO: 86.3 FL — SIGNIFICANT CHANGE UP (ref 80–100)
MONOCYTES # BLD AUTO: 0.89 K/UL — SIGNIFICANT CHANGE UP (ref 0–0.9)
MONOCYTES NFR BLD AUTO: 6.7 % — SIGNIFICANT CHANGE UP (ref 2–14)
NEUTROPHILS # BLD AUTO: 10.89 K/UL — HIGH (ref 1.8–7.4)
NEUTROPHILS NFR BLD AUTO: 82 % — HIGH (ref 43–77)
NRBC # FLD: 0 K/UL — SIGNIFICANT CHANGE UP (ref 0–0)
PLATELET # BLD AUTO: 175 K/UL — SIGNIFICANT CHANGE UP (ref 150–400)
PMV BLD: 12.1 FL — SIGNIFICANT CHANGE UP (ref 7–13)
RBC # BLD: 3.73 M/UL — LOW (ref 3.8–5.2)
RBC # FLD: 14 % — SIGNIFICANT CHANGE UP (ref 10.3–14.5)
WBC # BLD: 13.27 K/UL — HIGH (ref 3.8–10.5)
WBC # FLD AUTO: 13.27 K/UL — HIGH (ref 3.8–10.5)

## 2019-05-08 RX ORDER — OXYCODONE HYDROCHLORIDE 5 MG/1
5 TABLET ORAL
Qty: 0 | Refills: 0 | Status: DISCONTINUED | OUTPATIENT
Start: 2019-05-08 | End: 2019-05-10

## 2019-05-08 RX ORDER — IBUPROFEN 200 MG
600 TABLET ORAL EVERY 6 HOURS
Qty: 0 | Refills: 0 | Status: DISCONTINUED | OUTPATIENT
Start: 2019-05-08 | End: 2019-05-10

## 2019-05-08 RX ADMIN — OXYCODONE HYDROCHLORIDE 5 MILLIGRAM(S): 5 TABLET ORAL at 17:20

## 2019-05-08 RX ADMIN — OXYCODONE HYDROCHLORIDE 5 MILLIGRAM(S): 5 TABLET ORAL at 21:04

## 2019-05-08 RX ADMIN — Medication 600 MILLIGRAM(S): at 17:18

## 2019-05-08 RX ADMIN — Medication 600 MILLIGRAM(S): at 12:09

## 2019-05-08 RX ADMIN — Medication 975 MILLIGRAM(S): at 21:06

## 2019-05-08 RX ADMIN — Medication 975 MILLIGRAM(S): at 17:35

## 2019-05-08 RX ADMIN — Medication 975 MILLIGRAM(S): at 21:04

## 2019-05-08 RX ADMIN — Medication 600 MILLIGRAM(S): at 18:15

## 2019-05-08 RX ADMIN — Medication 600 MILLIGRAM(S): at 06:08

## 2019-05-08 RX ADMIN — OXYCODONE HYDROCHLORIDE 5 MILLIGRAM(S): 5 TABLET ORAL at 14:46

## 2019-05-08 RX ADMIN — Medication 30 MILLIGRAM(S): at 00:15

## 2019-05-08 RX ADMIN — MAGNESIUM HYDROXIDE 30 MILLILITER(S): 400 TABLET, CHEWABLE ORAL at 14:49

## 2019-05-08 RX ADMIN — HEPARIN SODIUM 5000 UNIT(S): 5000 INJECTION INTRAVENOUS; SUBCUTANEOUS at 06:07

## 2019-05-08 RX ADMIN — OXYCODONE HYDROCHLORIDE 5 MILLIGRAM(S): 5 TABLET ORAL at 21:06

## 2019-05-08 RX ADMIN — Medication 975 MILLIGRAM(S): at 02:31

## 2019-05-08 RX ADMIN — Medication 975 MILLIGRAM(S): at 03:00

## 2019-05-08 RX ADMIN — Medication 100 MILLIGRAM(S): at 12:10

## 2019-05-08 RX ADMIN — SIMETHICONE 80 MILLIGRAM(S): 80 TABLET, CHEWABLE ORAL at 16:38

## 2019-05-08 RX ADMIN — OXYCODONE HYDROCHLORIDE 5 MILLIGRAM(S): 5 TABLET ORAL at 09:00

## 2019-05-08 RX ADMIN — OXYCODONE HYDROCHLORIDE 5 MILLIGRAM(S): 5 TABLET ORAL at 15:45

## 2019-05-08 RX ADMIN — Medication 600 MILLIGRAM(S): at 13:05

## 2019-05-08 RX ADMIN — SIMETHICONE 80 MILLIGRAM(S): 80 TABLET, CHEWABLE ORAL at 08:05

## 2019-05-08 RX ADMIN — HEPARIN SODIUM 5000 UNIT(S): 5000 INJECTION INTRAVENOUS; SUBCUTANEOUS at 17:18

## 2019-05-08 RX ADMIN — Medication 975 MILLIGRAM(S): at 10:00

## 2019-05-08 RX ADMIN — OXYCODONE HYDROCHLORIDE 5 MILLIGRAM(S): 5 TABLET ORAL at 08:03

## 2019-05-08 RX ADMIN — Medication 975 MILLIGRAM(S): at 16:39

## 2019-05-08 RX ADMIN — Medication 975 MILLIGRAM(S): at 11:00

## 2019-05-08 RX ADMIN — SIMETHICONE 80 MILLIGRAM(S): 80 TABLET, CHEWABLE ORAL at 12:10

## 2019-05-08 NOTE — PROGRESS NOTE ADULT - SUBJECTIVE AND OBJECTIVE BOX
OB Progress Note:  Delivery, POD#1    S: 40yo POD#1 s/p LTCS . Her pain is well controlled. She is tolerating a regular diet and passing flatus. Denies N/V. Denies CP/SOB/lightheadedness/dizziness.   She is ambulating without difficulty.   Voiding spontaneously.     O:   Vital Signs Last 24 Hrs  T(C): 36.8 (08 May 2019 06:44), Max: 37.2 (07 May 2019 18:11)  T(F): 98.2 (08 May 2019 06:44), Max: 98.9 (07 May 2019 18:11)  HR: 102 (08 May 2019 06:44) (81 - 106)  BP: 101/57 (08 May 2019 06:44) (88/59 - 135/66)  BP(mean): 66 (07 May 2019 15:30) (61 - 69)  RR: 18 (08 May 2019 06:44) (17 - 26)  SpO2: 99% (08 May 2019 06:44) (96% - 100%)    Labs:  Blood type: O Positive  Rubella IgG: RPR: Negative                    PE:  General: NAD  Abdomen: Mildly distended, appropriately tender, incision c/d/i.  Extremities: No erythema, no pitting edema

## 2019-05-08 NOTE — PROGRESS NOTE ADULT - PROBLEM SELECTOR PLAN 1
- Continue regular diet.  - Increase ambulation.  - Continue motrin, tylenol, oxycodone PRN for pain control  - F/u AM AMADO Benitez, PGY-1

## 2019-05-08 NOTE — PROGRESS NOTE ADULT - SUBJECTIVE AND OBJECTIVE BOX
Pain Service Follow-up  Postop Day  1    S/P  C- Section    T(C): 36.8 (05-08-19 @ 06:44), Max: 37.2 (05-07-19 @ 18:11)  HR: 102 (05-08-19 @ 06:44) (81 - 106)  BP: 101/57 (05-08-19 @ 06:44) (88/59 - 135/66)  RR: 18 (05-08-19 @ 06:44) (17 - 26)  SpO2: 99% (05-08-19 @ 06:44) (96% - 100%)  Wt(kg): --      THERAPY:  Spinal Morphine     Sedation Score:	  [X] Alert	      [  ] Drowsy       [  ] Arousable	[  ] Asleep         [  ] Unresponsive    Side Effects:	  [X] None	      [  ] Nausea       [  ] Pruritus        [  ] Weakness   [  ] Numbness        ASSESSMENT/ PLAN   [ X ] Discontinue         [  ] Continue    [ X ] Documentation and Verification of current medications       Satisfactory Post Anesthetic Course

## 2019-05-09 DIAGNOSIS — O34.29 MATERNAL CARE DUE TO UTERINE SCAR FROM OTHER PREVIOUS SURGERY: ICD-10-CM

## 2019-05-09 DIAGNOSIS — N73.6 FEMALE PELVIC PERITONEAL ADHESIONS (POSTINFECTIVE): ICD-10-CM

## 2019-05-09 RX ORDER — GLYCERIN ADULT
1 SUPPOSITORY, RECTAL RECTAL ONCE
Refills: 0 | Status: COMPLETED | OUTPATIENT
Start: 2019-05-09 | End: 2019-05-09

## 2019-05-09 RX ORDER — FERROUS SULFATE 325(65) MG
325 TABLET ORAL DAILY
Refills: 0 | Status: DISCONTINUED | OUTPATIENT
Start: 2019-05-09 | End: 2019-05-10

## 2019-05-09 RX ORDER — SENNA PLUS 8.6 MG/1
1 TABLET ORAL ONCE
Refills: 0 | Status: COMPLETED | OUTPATIENT
Start: 2019-05-09 | End: 2019-05-10

## 2019-05-09 RX ADMIN — Medication 600 MILLIGRAM(S): at 13:58

## 2019-05-09 RX ADMIN — Medication 1 SUPPOSITORY(S): at 17:55

## 2019-05-09 RX ADMIN — Medication 600 MILLIGRAM(S): at 02:00

## 2019-05-09 RX ADMIN — HEPARIN SODIUM 5000 UNIT(S): 5000 INJECTION INTRAVENOUS; SUBCUTANEOUS at 17:00

## 2019-05-09 RX ADMIN — Medication 600 MILLIGRAM(S): at 21:55

## 2019-05-09 RX ADMIN — Medication 1 TABLET(S): at 13:58

## 2019-05-09 RX ADMIN — Medication 600 MILLIGRAM(S): at 01:09

## 2019-05-09 RX ADMIN — Medication 600 MILLIGRAM(S): at 08:25

## 2019-05-09 RX ADMIN — Medication 600 MILLIGRAM(S): at 20:00

## 2019-05-09 RX ADMIN — OXYCODONE HYDROCHLORIDE 5 MILLIGRAM(S): 5 TABLET ORAL at 05:48

## 2019-05-09 RX ADMIN — Medication 975 MILLIGRAM(S): at 05:17

## 2019-05-09 RX ADMIN — Medication 600 MILLIGRAM(S): at 09:20

## 2019-05-09 RX ADMIN — SIMETHICONE 80 MILLIGRAM(S): 80 TABLET, CHEWABLE ORAL at 10:22

## 2019-05-09 RX ADMIN — OXYCODONE HYDROCHLORIDE 5 MILLIGRAM(S): 5 TABLET ORAL at 05:16

## 2019-05-09 RX ADMIN — HEPARIN SODIUM 5000 UNIT(S): 5000 INJECTION INTRAVENOUS; SUBCUTANEOUS at 06:45

## 2019-05-09 RX ADMIN — Medication 1 SUPPOSITORY(S): at 09:00

## 2019-05-09 RX ADMIN — Medication 975 MILLIGRAM(S): at 05:48

## 2019-05-09 RX ADMIN — MAGNESIUM HYDROXIDE 30 MILLILITER(S): 400 TABLET, CHEWABLE ORAL at 10:23

## 2019-05-09 RX ADMIN — Medication 600 MILLIGRAM(S): at 14:40

## 2019-05-09 NOTE — CHART NOTE - NSCHARTNOTEFT_GEN_A_CORE
Pt is POD 2 s/p rLTCS/btl  evaluated after episode abd pain. Patient was found to be distended from gas. Tolerating PO. Has had a BM. Says she has passed more gas and feels slightly better, but still has cramps. Denies HA, dizziness, lightheadedness, CP, SOB, palpitations, excess abdominal pain, vaginal bleeding, LE tenderness.     Vital Signs Last 24 Hrs  T(C): 36.6 (09 May 2019 21:40), Max: 36.8 (09 May 2019 14:01)  T(F): 97.9 (09 May 2019 21:40), Max: 98.2 (09 May 2019 14:01)  HR: 74 (09 May 2019 21:40) (74 - 102)  BP: 116/63 (09 May 2019 21:40) (113/58 - 125/65)  BP(mean): --  RR: 18 (09 May 2019 21:40) (18 - 18)  SpO2: 98% (09 May 2019 21:40) (98% - 100%)    I&O's Detail                            10.4   13.27 )-----------( 175      ( 08 May 2019 06:00 )             32.2               CAPILLARY BLOOD GLUCOSE    Physical Exam  Abd: Mildly distended, appropriately tender, incision CDI, no guarding/rebound tenderness, tympanic, normal bowel sounds  VE: No heavy vaginal bleeding    A/P: Pt is POD2 after rLTCS/BTL . Improving abdominal pain  -pain improved with suppository  -Can give senna   -encourage ambulation  -Continue PO    Jorge Atwood PGY-1

## 2019-05-09 NOTE — PROGRESS NOTE ADULT - SUBJECTIVE AND OBJECTIVE BOX
Postpartum Note,  Section  She is a  39y woman who is now post-operative day: 2    Subjective:  The patient states passed flatus x1 this afternoon  She is ambulating.   She is tolerating regular diet.  She denies nausea and vomiting.  She is voiding.  Her pain is controlled.  She reports normal postpartum bleeding.  She is breastfeeding.  She is formula feeding.    Physical exam:    Vital Signs Last 24 Hrs  T(C): 36.8 (09 May 2019 14:01), Max: 37.1 (08 May 2019 21:55)  T(F): 98.2 (09 May 2019 14:01), Max: 98.7 (08 May 2019 21:55)  HR: 102 (09 May 2019 14:01) (95 - 102)  BP: 125/65 (09 May 2019 14:01) (95/55 - 125/65)  BP(mean): --  RR: 18 (09 May 2019 14:01) (18 - 18)  SpO2: 99% (09 May 2019 14:01) (96% - 100%)    Gen: NAD  Breast: Soft, nontender, not engorged.  Abdomen: Soft, nontender distended, typanitic, + bowel sounds  Incision: Clean, dry, and intact with steri strips  Pelvic: Normal lochia noted  Ext: No calf tenderness    LABS:                        10.4   13.27 )-----------( 175      ( 08 May 2019 06:00 )             32.2       Rubella status:     Allergies    No Known Allergies    Intolerances      MEDICATIONS  (STANDING):  acetaminophen   Tablet .. 975 milliGRAM(s) Oral every 6 hours  diphtheria/tetanus/pertussis (acellular) Vaccine (ADAcel) 0.5 milliLiter(s) IntraMuscular once  ferrous    sulfate 325 milliGRAM(s) Oral daily  glycerin Suppository - Adult 1 Suppository(s) Rectal once  heparin  Injectable 5000 Unit(s) SubCutaneous every 12 hours  ibuprofen  Tablet. 600 milliGRAM(s) Oral every 6 hours  prenatal multivitamin 1 Tablet(s) Oral daily    MEDICATIONS  (PRN):  diphenhydrAMINE 25 milliGRAM(s) Oral every 6 hours PRN Itching  docusate sodium 100 milliGRAM(s) Oral two times a day PRN Stool softening  lanolin Ointment 1 Application(s) Topical every 6 hours PRN Sore Nipples  magnesium hydroxide Suspension 30 milliLiter(s) Oral two times a day PRN Constipation  oxyCODONE    IR 5 milliGRAM(s) Oral every 3 hours PRN Moderate Pain (4 - 6)  oxyCODONE    IR 5 milliGRAM(s) Oral once PRN Severe Pain (7 - 10)  simethicone 80 milliGRAM(s) Chew every 4 hours PRN Gas        Assessment and Plan  POD #2   s/p  section  Distended, - BM.  Encourage ambulation.  Continue routine post op care.

## 2019-05-09 NOTE — PROGRESS NOTE ADULT - SUBJECTIVE AND OBJECTIVE BOX
Patient assessed at 0915.  Subjective  Pain: Patient reports intense abdominal incision and uterine pain at the time of assessment. Patient reports " I feel depressed because I have so much pain" Patient started crying at the time of assessment when asked feeling depressed due to pain. Patient denies any history of depression. Patient denies any nausea/vomiting and any history of constipation. Patient reports more pain on left side of abdominal incision. Patient reports no relief of pain with usage of abdominal binder.   Complaints: None. Patient denies any headache, blur vision, dizziness and/or weakness/fatigue  Milestones:  Alert and orientedx3. Out of bed ambulating. Positive flatus. Negative bowel movement. Voiding.  Tolerating a regular diet.  Infant feeding: breastfeeding and formula feeding  Feeding related issues and/or concerns: none    Objective  Vital Signs:  Vital Signs Last 24 Hrs  T(C): 36.7 (09 May 2019 06:47), Max: 37.1 (08 May 2019 21:55)  T(F): 98 (09 May 2019 06:47), Max: 98.7 (08 May 2019 21:55)  HR: 95 (09 May 2019 06:47) (95 - 100)  BP: 113/58 (09 May 2019 06:47) (95/55 - 120/68)  BP(mean): --  RR: 18 (09 May 2019 06:47) (18 - 19)  SpO2: 100% (09 May 2019 06:47) (96% - 100%)    Labs:                        10.4   13.27 )-----------( 175      ( 08 May 2019 06:00 )             32.2     Blood Type: Opositive  Rubella: Immune  RPR: nonreactive    Medications  MEDICATIONS  (STANDING):  acetaminophen   Tablet .. 975 milliGRAM(s) Oral every 6 hours  diphtheria/tetanus/pertussis (acellular) Vaccine (ADAcel) 0.5 milliLiter(s) IntraMuscular once  heparin  Injectable 5000 Unit(s) SubCutaneous every 12 hours  ibuprofen  Tablet. 600 milliGRAM(s) Oral every 6 hours    MEDICATIONS  (PRN):  diphenhydrAMINE 25 milliGRAM(s) Oral every 6 hours PRN Itching  docusate sodium 100 milliGRAM(s) Oral two times a day PRN Stool softening  lanolin Ointment 1 Application(s) Topical every 6 hours PRN Sore Nipples  magnesium hydroxide Suspension 30 milliLiter(s) Oral two times a day PRN Constipation  oxyCODONE    IR 5 milliGRAM(s) Oral every 3 hours PRN Moderate Pain (4 - 6)  oxyCODONE    IR 5 milliGRAM(s) Oral once PRN Severe Pain (7 - 10)  simethicone 80 milliGRAM(s) Chew every 4 hours PRN Gas    Assessment  38y/o     Day#2   repeat post-operative  section delivery vacuum assisted with bilateral tubal ligation.   Condition: Stable  Past Medical History significant for: HSV on valtrex  Current Issues: EBL 950cc adhesions, feeling of sadness/depression due to pain referred to social work.  Lung sounds clear bilaterally.  Breasts: soft, nontender  Nipples: intact  Abdomen: Soft, distended and slightly tender tympanic abdomen. Bowel sounds present. Fundus firm  Abdominal incision: Clean, dry and intact with steri strips.   Vaginal: Lochia light rubra  Extremities: Slight edema noted bilaterally and equal to lower extremities, nontender with no erythremic areas noted. Positive pedal pulses  Other relevant physical exam findings: none    Plan  Discussed pain management regimen, constipation/gas relieving measure. Will continue to monitor.   Continue routine post-operative and postpartum care  Increase ambulation, analgesia PRN and pain medication protocol standing oxycodone, ibuprofen and acetaminophen.  Encouraged to wear abdominal binder for support and to use incentive spirometer Patient assessed at 0915.  Subjective  Pain: Patient reports intense abdominal incision and uterine pain at the time of assessment. Patient reports " I feel depressed because I have so much pain" Patient started crying at the time of assessment when asked feeling depressed due to pain. Patient denies any history of depression. Patient denies any nausea/vomiting and any history of constipation. Patient reports more pain on left side of abdominal incision. Patient reports no relief of pain with usage of abdominal binder.   Complaints: None. Patient denies any headache, blur vision, dizziness and/or weakness/fatigue  Milestones:  Alert and orientedx3. Out of bed ambulating. Negative flatus. Negative bowel movement. Voiding.  Tolerating a regular diet.  Infant feeding: breastfeeding and formula feeding  Feeding related issues and/or concerns: none    Objective  Vital Signs:  Vital Signs Last 24 Hrs  T(C): 36.7 (09 May 2019 06:47), Max: 37.1 (08 May 2019 21:55)  T(F): 98 (09 May 2019 06:47), Max: 98.7 (08 May 2019 21:55)  HR: 95 (09 May 2019 06:47) (95 - 100)  BP: 113/58 (09 May 2019 06:47) (95/55 - 120/68)  BP(mean): --  RR: 18 (09 May 2019 06:47) (18 - 19)  SpO2: 100% (09 May 2019 06:47) (96% - 100%)    Labs:                        10.4   13.27 )-----------( 175      ( 08 May 2019 06:00 )             32.2     Blood Type: Opositive  Rubella: Immune  RPR: nonreactive    Medications  MEDICATIONS  (STANDING):  acetaminophen   Tablet .. 975 milliGRAM(s) Oral every 6 hours  diphtheria/tetanus/pertussis (acellular) Vaccine (ADAcel) 0.5 milliLiter(s) IntraMuscular once  heparin  Injectable 5000 Unit(s) SubCutaneous every 12 hours  ibuprofen  Tablet. 600 milliGRAM(s) Oral every 6 hours    MEDICATIONS  (PRN):  diphenhydrAMINE 25 milliGRAM(s) Oral every 6 hours PRN Itching  docusate sodium 100 milliGRAM(s) Oral two times a day PRN Stool softening  lanolin Ointment 1 Application(s) Topical every 6 hours PRN Sore Nipples  magnesium hydroxide Suspension 30 milliLiter(s) Oral two times a day PRN Constipation  oxyCODONE    IR 5 milliGRAM(s) Oral every 3 hours PRN Moderate Pain (4 - 6)  oxyCODONE    IR 5 milliGRAM(s) Oral once PRN Severe Pain (7 - 10)  simethicone 80 milliGRAM(s) Chew every 4 hours PRN Gas    Assessment  38y/o     Day#2   repeat post-operative  section delivery vacuum assisted with bilateral tubal ligation.   Condition: Stable  Past Medical History significant for: HSV on valtrex  Current Issues: EBL 950cc adhesions, feeling of sadness/depression due to pain referred to social work.  Lung sounds clear bilaterally.  Breasts: soft, nontender  Nipples: intact  Abdomen: Soft, distended and slightly tender tympanic abdomen. Bowel sounds present. Fundus firm  Abdominal incision: Clean, dry and intact with steri strips.   Vaginal: Lochia light rubra  Extremities: Slight edema noted bilaterally and equal to lower extremities, nontender with no erythremic areas noted. Positive pedal pulses  Other relevant physical exam findings: none    Plan  Discussed pain management regimen, constipation/gas relieving measure. Will continue to monitor.   Continue routine post-operative and postpartum care  Increase ambulation, analgesia PRN and pain medication protocol standing oxycodone, ibuprofen and acetaminophen.  Encouraged to wear abdominal binder for support and to use incentive spirometer

## 2019-05-10 VITALS
DIASTOLIC BLOOD PRESSURE: 57 MMHG | RESPIRATION RATE: 18 BRPM | OXYGEN SATURATION: 100 % | HEART RATE: 97 BPM | SYSTOLIC BLOOD PRESSURE: 107 MMHG | TEMPERATURE: 98 F

## 2019-05-10 RX ORDER — ACETAMINOPHEN 500 MG
3 TABLET ORAL
Qty: 0 | Refills: 0 | DISCHARGE
Start: 2019-05-10

## 2019-05-10 RX ORDER — IBUPROFEN 200 MG
1 TABLET ORAL
Qty: 0 | Refills: 0 | DISCHARGE
Start: 2019-05-10

## 2019-05-10 RX ORDER — OXYCODONE HYDROCHLORIDE 5 MG/1
1 TABLET ORAL
Qty: 15 | Refills: 0
Start: 2019-05-10

## 2019-05-10 RX ORDER — VALACYCLOVIR 500 MG/1
1 TABLET, FILM COATED ORAL
Qty: 0 | Refills: 0 | DISCHARGE

## 2019-05-10 RX ORDER — OXYCODONE HYDROCHLORIDE 5 MG/1
1 TABLET ORAL
Qty: 0 | Refills: 0 | DISCHARGE
Start: 2019-05-10

## 2019-05-10 RX ORDER — IBUPROFEN 200 MG
1 TABLET ORAL
Qty: 50 | Refills: 1
Start: 2019-05-10

## 2019-05-10 RX ADMIN — SIMETHICONE 80 MILLIGRAM(S): 80 TABLET, CHEWABLE ORAL at 04:48

## 2019-05-10 RX ADMIN — Medication 600 MILLIGRAM(S): at 04:46

## 2019-05-10 RX ADMIN — Medication 600 MILLIGRAM(S): at 12:48

## 2019-05-10 RX ADMIN — Medication 325 MILLIGRAM(S): at 12:17

## 2019-05-10 RX ADMIN — Medication 100 MILLIGRAM(S): at 12:17

## 2019-05-10 RX ADMIN — HEPARIN SODIUM 5000 UNIT(S): 5000 INJECTION INTRAVENOUS; SUBCUTANEOUS at 06:30

## 2019-05-10 RX ADMIN — Medication 1 TABLET(S): at 12:17

## 2019-05-10 RX ADMIN — Medication 600 MILLIGRAM(S): at 12:15

## 2019-05-10 RX ADMIN — SENNA PLUS 1 TABLET(S): 8.6 TABLET ORAL at 00:15

## 2019-05-10 RX ADMIN — Medication 600 MILLIGRAM(S): at 06:00

## 2019-05-10 NOTE — DISCHARGE NOTE OB - MATERIALS PROVIDED
[facility] Screening Program/Discharge Medication Information for Patients and Families Pocket Guide/Guide to Postpartum Care/  Immunization Record/Shaken Baby Prevention Handout

## 2019-05-10 NOTE — DISCHARGE NOTE OB - PATIENT PORTAL LINK FT
You can access the [facility] Portal, offered by [facility], by registering with the following website: [email or web address]

## 2019-05-10 NOTE — DISCHARGE NOTE OB - CARE PLAN
Principal Discharge DX:	 delivery delivered  Goal:	normal postoperative course  Assessment and plan of treatment:	Nothing in vagina, no heavy lifting  Secondary Diagnosis:	Previous  section  Secondary Diagnosis:	Keloid scar due to other cause  Secondary Diagnosis:	Maternal care due to uterine scar from other previous surgery  Secondary Diagnosis:	Herpes

## 2019-05-10 NOTE — DISCHARGE NOTE OB - MEDICATION SUMMARY - MEDICATIONS TO TAKE
I will START or STAY ON the medications listed below when I get home from the hospital:    ibuprofen 600 mg oral tablet  -- 1 tab(s) by mouth every 6 hours  -- Indication: For Pain    acetaminophen 325 mg oral tablet  -- 3 tab(s) by mouth every 6 hours  -- Indication: For Pain    oxyCODONE 5 mg oral tablet  -- 1 tab(s) by mouth every 3 hours, As needed, Moderate Pain (4 - 6)  -- Indication: For severe pain    oxyCODONE 5 mg oral tablet  -- 1 tab(s) by mouth every 3 hours, As needed, Moderate Pain (4 - 6) MDD:8  -- Indication: For severe pain    ibuprofen 600 mg oral tablet  -- 1 tab(s) by mouth every 6 hours MDD:4  -- Indication: For Pain    Prenatal DHA  -- 1 tab(s) by mouth once a day  -- Indication: For breastfeeding

## 2019-05-10 NOTE — PROGRESS NOTE ADULT - SUBJECTIVE AND OBJECTIVE BOX
Patient assessed at 0856.  Subjective  Pain: Patient reports having abdominal incision pain more the left side than right side, but being relieved by pain medication.    Complaints: Patient reports improvement of gas discomfort/ constipation. Patient denies any nausea/vomiting. Patient denies any headache, blur vision, dizziness and/or weakness/fatigue. Patient denies feeling sad/depressed today.  Milestones:  Alert and orientedx3. Out of bed ambulating. Positive flatus. Positive bowel movement. Voiding.  Tolerating a regular diet.  Infant feeding: breastfeeding and formula feeding  Feeding related issues and/or concerns: none    Vital Signs Last 24 Hrs  T(C): 36.7 (10 May 2019 05:53), Max: 36.8 (09 May 2019 14:01)  T(F): 98 (10 May 2019 05:53), Max: 98.2 (09 May 2019 14:01)  HR: 97 (10 May 2019 05:53) (74 - 102)  BP: 107/57 (10 May 2019 05:53) (107/57 - 125/65)  BP(mean): --  RR: 18 (10 May 2019 05:53) (18 - 18)  SpO2: 100% (10 May 2019 05:53) (98% - 100%)    Labs:                        10.4   13.27 )-----------( 175      ( 08 May 2019 06:00 )             32.2     Blood Type: Opositive  Rubella: Immune  RPR: nonreactive    MEDICATIONS  (STANDING):  acetaminophen   Tablet .. 975 milliGRAM(s) Oral every 6 hours  diphtheria/tetanus/pertussis (acellular) Vaccine (ADAcel) 0.5 milliLiter(s) IntraMuscular once  ferrous    sulfate 325 milliGRAM(s) Oral daily  heparin  Injectable 5000 Unit(s) SubCutaneous every 12 hours  ibuprofen  Tablet. 600 milliGRAM(s) Oral every 6 hours  prenatal multivitamin 1 Tablet(s) Oral daily    MEDICATIONS  (PRN):  diphenhydrAMINE 25 milliGRAM(s) Oral every 6 hours PRN Itching  docusate sodium 100 milliGRAM(s) Oral two times a day PRN Stool softening  lanolin Ointment 1 Application(s) Topical every 6 hours PRN Sore Nipples  magnesium hydroxide Suspension 30 milliLiter(s) Oral two times a day PRN Constipation  oxyCODONE    IR 5 milliGRAM(s) Oral every 3 hours PRN Moderate Pain (4 - 6)  oxyCODONE    IR 5 milliGRAM(s) Oral once PRN Severe Pain (7 - 10)  simethicone 80 milliGRAM(s) Chew every 4 hours PRN Gas      Assessment  38y/o     Day#3   repeat post-operative  section delivery vacuum assisted with bilateral tubal ligation.   Condition: Stable  Past Medical History significant for: HSV on valtrex  Current Issues: EBL 950cc adhesions, gas discomfort with relief and bowel movement after constipation relieving intervention.  Lung sounds clear bilaterally.  Breasts: soft, nontender  Nipples: intact  Abdomen: Soft, distended and slightly tender tympanic abdomen. Bowel sounds present. Fundus firm  Abdominal incision: Clean, dry and intact with steri strips.   Vaginal: Lochia light rubra  Extremities: No edema noted bilaterally and equal to lower extremities, nontender with no erythremic areas noted. Positive pedal pulses  Other relevant physical exam findings: none    Plan  Discussed pain management regimen, constipation/gas relieving measure.   Continue routine post-operative and postpartum care  Increase ambulation, analgesia PRN and pain medication protocol standing oxycodone, ibuprofen and acetaminophen.  Encouraged to wear abdominal binder for support and to use incentive spirometer  Discharge to home. Discussed discharge planning.

## 2019-05-10 NOTE — DISCHARGE NOTE OB - HOSPITAL COURSE
Delivered liveborn female infant via repeat CS.  Bilateral salpingectomy performed for sterilization.  Lysis of adhesions performed for pelvic adhesions.

## 2019-05-10 NOTE — DISCHARGE NOTE OB - SECONDARY DIAGNOSIS.
Previous  section Keloid scar due to other cause Maternal care due to uterine scar from other previous surgery Herpes

## 2019-05-12 ENCOUNTER — EMERGENCY (EMERGENCY)
Facility: HOSPITAL | Age: 39
LOS: 1 days | Discharge: HOME | End: 2019-05-12
Attending: EMERGENCY MEDICINE | Admitting: EMERGENCY MEDICINE
Payer: COMMERCIAL

## 2019-05-12 VITALS
HEART RATE: 91 BPM | RESPIRATION RATE: 18 BRPM | DIASTOLIC BLOOD PRESSURE: 82 MMHG | TEMPERATURE: 98 F | OXYGEN SATURATION: 100 % | SYSTOLIC BLOOD PRESSURE: 133 MMHG

## 2019-05-12 DIAGNOSIS — Z98.891 HISTORY OF UTERINE SCAR FROM PREVIOUS SURGERY: Chronic | ICD-10-CM

## 2019-05-12 DIAGNOSIS — T14.8XXA OTHER INJURY OF UNSPECIFIED BODY REGION, INITIAL ENCOUNTER: ICD-10-CM

## 2019-05-12 PROCEDURE — 99283 EMERGENCY DEPT VISIT LOW MDM: CPT

## 2019-05-12 NOTE — ED PROVIDER NOTE - CLINICAL SUMMARY MEDICAL DECISION MAKING FREE TEXT BOX
39 year s/p  on 19. Presents with one day of discharge from surgical incision site. Exam consistent with no evidence of infection. OB consultant for further evaluation.

## 2019-05-12 NOTE — ED PROVIDER NOTE - SKIN, MLM
Skin normal color for race, warm, dry and intact. No evidence of rash. no erythema, no perilymph, no sign of infection

## 2019-05-12 NOTE — CONSULT NOTE ADULT - ASSESSMENT
39y  s/p rLTCS with bilateral salpingectomy on  presents with serosanguinous drainage from Pfannenstiel incision, in stable condition.

## 2019-05-12 NOTE — CONSULT NOTE ADULT - SUBJECTIVE AND OBJECTIVE BOX
GYN Consult Note     39y  s/p rLTCS with bilateral salpingectomy on  presents with serosanguinous drainage from Pfannenstiel incision. Patient reports noticing drainage from incision since time of discharge and was concerned for wound separation. She reports doing well postoperatively. She denies any fevers, chills, CP/SOB, N/V, constipation, diarrhea, dysuria, vaginal discharge. She reports vaginal bleeding is mild, now using about 2-3 pads per day, not saturating any pads.     OB/GYN HISTORY:  - 1  in 2004, 1 C/S for NRFHT , 1 repeat C/S w/ BS 19. Hx of HSV. Denies any uterine fibroids, ovarian cysts or abnormal pap smears.     Name of GYN Physician: Timothy   History of Abnormal Pap: denies     PAST MEDICAL & SURGICAL HISTORY:  Herpes: last outbreak few months ago  Previous  section: 2017 fetal distress male 7#5      REVIEW OF SYSTEMS  serosanguinous wound drainage   denies any fevers, chills, CP/SOB, N/V, constipation, diarrhea, dysuria, vaginal discharge.     Allergies  No Known Allergies      FAMILY HISTORY:  Family history of cancer in father (Father): liver cancer      Vital Signs Last 24 Hrs  T(C): 36.8 (12 May 2019 09:17), Max: 36.8 (12 May 2019 09:17)  T(F): 98.2 (12 May 2019 09:17), Max: 98.2 (12 May 2019 09:17)  HR: 91 (12 May 2019 09:17) (91 - 91)  BP: 133/82 (12 May 2019 09:17) (133/82 - 133/82)  BP(mean): --  RR: 18 (12 May 2019 09:17) (18 - 18)  SpO2: 100% (12 May 2019 09:17) (100% - 100%)    PHYSICAL EXAM:    Constitutional: alert and oriented x 3    Gastrointestinal: soft, non tender, fundus firm; nontender, nondistended     Incision: wound with mild amount of serosanguinous drainage from incision, steri strips falling off      LABS:  N/A       RADIOLOGY & ADDITIONAL STUDIES:  N/A

## 2019-05-12 NOTE — ED ADULT TRIAGE NOTE - CHIEF COMPLAINT QUOTE
Patient reports having  on , and now having increased bleeding and foul drainage. On assessment, steri strips are in place, with minimal serosanguineous drainage.

## 2019-05-12 NOTE — ED PROVIDER NOTE - ABDOMINAL EXAM
Suprapubic longitudinal scar consistent with . Multiple seristrpis replaced along surgical line. Middle two steri strips are loosened and there is mild distension to the area.

## 2019-05-12 NOTE — CONSULT NOTE ADULT - PROBLEM SELECTOR RECOMMENDATION 9
- Incision without evidence of infection, mild amount of serosanguinous drainage noted; no malodorous discharge, no erythema or edema  - Steri strips replaced and patient counseled on wound care  - Patient to follow up with Dr. Aguirre in the office tomorrow for wound check   - All questions addressed and answered    D/w Dr. Og Belle, PGY-2

## 2019-05-13 PROBLEM — B00.9 HERPESVIRAL INFECTION, UNSPECIFIED: Chronic | Status: ACTIVE | Noted: 2019-05-07

## 2021-04-29 NOTE — PATIENT PROFILE OB - HARM RISK FACTORS
Post-Care Instructions: I reviewed with the patient in detail post-care instructions. Patient is to wear sunprotection, and avoid picking at any of the treated lesions. Pt may apply Vaseline to crusted or scabbing areas. Duration Of Freeze Thaw-Cycle (Seconds): 5 Detail Level: Detailed Consent: The patient's consent was obtained including but not limited to risks of crusting, scabbing, blistering, scarring, darker or lighter pigmentary change, recurrence, incomplete removal and infection. Render Post-Care Instructions In Note?: no Number Of Freeze-Thaw Cycles: 1 freeze-thaw cycle no

## 2022-10-23 NOTE — PATIENT PROFILE OB - TOBACCO USE
Patient's first and last name, , procedure, and correct site confirmed prior to the start of procedure. Never smoker

## 2024-05-02 NOTE — OB RN PATIENT PROFILE - EDUCATION PROVIDED ON BREASTFEEDING ASSESSMENT AND INSTRUCTION; INCLUDING POSITIONING, NEWBORN ATTACHMENT, AND COMFORT
MEDICARE WELLNESS VISIT + NOTE    CHIEF COMPLAINT:  Elena presents for her Subsequent Annual Medicare Wellness Visit.   Her additional complaints or concerns are addressed below.     Patient Care Team:  Schoeneich, Marzenna M, MD as PCP - General (Internal Medicine)        Patient Active Problem List   Diagnosis    Arthritis    Carpal tunnel syndrome    Cough    Generalized anxiety disorder    Lumbago    Moderate episode of recurrent major depressive disorder (CMD)    Neck pain    Osteopenia of multiple sites    Osteophyte    Pain in right shoulder    Pleural effusion    Precordial pain    Presence of other orthopedic joint implants    Presence of retained hardware    Reactive depression    Sacroiliitis, not elsewhere classified (CMD)    Tear of right rotator cuff    Cervical radiculopathy    Looney's neuroma of left foot         Past Medical History:   Diagnosis Date    Diabetes (CMD) 02/15/2021    Environmental allergies     dust & mold cause nasal congestion    Fuchs' corneal dystrophy     s/p corneal transplant: both eyes    History of breast cancer 04/01/2007    History of depression     Hypercholesterolemia     Pseudophakia of both eyes     S/P YAG capsulotomy     both eyes (OS: Dec 2003   OD: Sept 2010)         Past Surgical History:   Procedure Laterality Date    Carpal tunnel release  6 &10/14    Cataract extraction w/  intraocular lens implant   Oct 30, 2002    OD    Corneal transplant,penetrating  March 27, 2002    PK:  OD    Eye surgery  April 23, 2003    OS:  PK with Cataract ext. & IOL    Foot surgery  2013    toe repair left big toe done 2 times    Mastectomy      Shoulder surgery Right 2019    Rotator Cuff ; torn bicep          Social History     Tobacco Use    Smoking status: Never    Smokeless tobacco: Never   Vaping Use    Vaping status: never used   Substance Use Topics    Alcohol use: Yes     Comment: soc occ    Drug use: Never     Family History   Problem Relation Age of Onset    Diabetes  Mother     Heart disease Mother     Cataracts Father     Ophthalmology Sister         PK    Cataracts Sister         mother    Amblyopia Daughter     Vision Loss Paternal Aunt     Stroke Paternal Aunt          Current Outpatient Medications   Medication Sig Dispense Refill    Multiple Vitamins-Minerals (Hair Skin and Nails Formula) Tab Take 1 tablet by mouth.      Lactobacillus (PROBIOTIC ACIDOPHILUS PO)       tiZANidine (ZANAFLEX) 4 MG tablet Take 1 tablet by mouth at bedtime. 90 tablet 0    Pyridoxine HCl (VITAMIN B6 PO) Take 1 tablet by mouth daily.      busPIRone (BUSPAR) 10 MG tablet Take 1 tablet by mouth in the morning and 1 tablet in the evening. (Patient taking differently: Take 10 mg by mouth in the morning and 10 mg in the evening. Takes as needed) 180 tablet 1    MAGNESIUM OXIDE PO Take 450 mg by mouth daily.      BIOTIN PO Take by mouth daily.      atorvastatin (LIPITOR) 10 MG tablet Take 1 tablet by mouth at bedtime. 90 tablet 1    meloxicam (MOBIC) 15 MG tablet Take 1 tablet by mouth daily. 90 tablet 1    gabapentin (NEURONTIN) 600 MG tablet TAKE 1/2 TABLET BY MOUTH IN THE MORNING AND 1 TABLET AT NIGHT      DULoxetine (CYMBALTA) 60 MG capsule Take 60 mg by mouth daily.      cyanocobalamin 1000 MCG tablet Take 1,000 mcg by mouth daily.      buPROPion (WELLBUTRIN SR) 200 MG 12 hr tablet Take 200 mg by mouth 2 times daily.      MULTIPLE VITAMINS PO Take 1 tablet by mouth daily.      Ascorbic Acid (VITAMIN C) 500 MG tablet Take 500 mg by mouth daily.      calcium carbonate (OS-JUICE) 600 MG TABS Take 1,200 mg by mouth daily.      cholecalciferol (VITAMIN D3) 1000 UNITS tablet Take 1,000 Units by mouth daily.      Lutein 20 MG TABS Take 1 tablet by mouth daily.      sodium chloride (CARLA 128) 5 % ophthalmic ointment Place 1 drop into both eyes as needed. Apply 1/4 inch inside lower lids as needed.       No current facility-administered medications for this visit.        The following items on the Medicare  Health Risk Assessment were found to be positive  1.) Do you have an Advance directive, living will, or power of  for health care document that contains your wishes for end of life care?: No     2.) Would you like additional information on advance directives?: Yes     3.) During the past 4 weeks, how would you rate your health?: Fair     7a.) Have you had a fall in the past year?: Yes     7b.) Do you feel unsteady when standing or walking?: Yes     7c.) Do you worry about falling?: Yes     8.) During the past 4 weeks, has your physical and emotional health limited your social activities with family, friends, neighbors, or other groups?: Moderately     11d.) Bodily pain: Always     11e.) Tiredness or Fatigue: Often     11f.) Feeling stressed or overwhelmed: Often     11g.) Anger or frustration: Often     14.) During the past 4 weeks, was someone available to help if you needed and wanted help?: Yes, a little     15.) How confident are you that you can control and manage most of your health problems?: Somewhat confident         Vision and Hearing screens: Not performed    Advance care planning documents on file - no    Cognitive/Functional Status: no evidence of cognitive dysfunction by direct observation    Opioid Review: Susanne is not taking opioid medications.    Recent PHQ 2/9 Score:    PHQ 2:  PHQ 2 Score Adult PHQ 2 Score Adult PHQ 2 Interpretation Little interest or pleasure in activity?   5/1/2024  11:18 PM 2 No further screening needed 1       PHQ 9:  PHQ 9 Score Adult PHQ 9 Score Adult PHQ 9 Interpretation   1/30/2024   1:33 PM 9 Mild Depression       DEPRESSION ASSESSMENT/PLAN:  Start and/or continue medication.  See orders for details.      Body mass index is 24.46 kg/m².    BMI FOLLOW-UP/PLAN:  Patient BMI is within normal range.    Needed Screening/Treatment:   Bone density  Needed follow up:  None    See orders.   See Patient Instructions section.   Return in about 1 year (around 5/2/2025) for  Medicare Wellness Visit.      OUTPATIENT PROGRESS NOTE    Subjective   Chief Complaint Medicare Wellness Visit (Subsequent) and Office Visit    HPI patient is coming here to follow-up on hyperlipidemia her main complaint is neck pain with tingling sensation in the left arm she is seeing pain management and MRI of the neck it has been ordered.  Test results reviewed with the patient and are with normal limits except slight elevation of creatinine 1.01 patient is taking meloxicam daily.    Medications  Medications were reviewed and updated today.    Histories  I have personally reviewed and updated the patient's past medical, past surgical, family and social histories during today's visit.    Review of Systems: The patient DENIES symptoms of:  GENERAL fever, chills, night sweats, fatigue, weight loss, and weight gain  NECK dysphagia, odynophagia, and sensation of food sticking  CARDIORESP chest pain, palpitations, fast heart rate, edema, cough, and shortness of breath  GI abdominal pain, nausea, vomiting, constipation, and diarrhea  MUSCULOSKEL joint swelling and back pain    Objective   Visit Vitals  /70   Pulse 96   Ht 5' 4\" (1.626 m)   Wt 64.6 kg (142 lb 8 oz)   BMI 24.46 kg/m²     Physical Exam  Visit Vitals  /70   Pulse 96   Ht 5' 4\" (1.626 m)   Wt 64.6 kg (142 lb 8 oz)   BMI 24.46 kg/m²     General appearance: alert  Head: Normocephalic, without obvious abnormality, atraumatic  Neck: no JVD and supple, symmetrical, trachea midline  Lungs: clear to auscultation bilaterally  Heart: regular rate and rhythm, S1, S2 normal, no murmur, click, rub or gallop  Abdomen: Soft, non-tender; bowel sounds normal; no masses, no hepatosplenomegaly  Extremities: no edema, redness or tenderness in the calves or thighs    Laboratory  I have reviewed the pertinent laboratory tests. These are the pertinent findings:  Test results reviewed with the patient see below.    Imaging  I have reviewed the pertinent imaging study  reports. These are the pertinent findings:  Pending, further recommendations to follow when test results are available.    Assessment & Plan   Encounter for preventive care  (primary encounter diagnosis)    Plan: [id number] - 65+ Follow up preventive, established         patient            Mixed hyperlipidemia  Comment:  Stable continue the same medication.  Plan: atorvastatin (LIPITOR) 10 MG tablet,         Comprehensive Metabolic Panel, Lipid Panel         Without Reflex            Asymptomatic menopausal state  Comment:  Further recommendations to follow when test results are available.  Plan: BD DEXA SCAN AXIAL SKELETON            Vitamin D deficiency disease  Comment:  Advised patient to reduce vitamin D3.      Statement Selected

## 2024-05-03 NOTE — ED PROVIDER NOTE - CROS ED CONS ALL NEG
negative... adherence issues identified.  Functional and cognitive limitations include: Patient states can do all activities of daily living, however due to OA in knees cannot walk long distances or participate in sports.   Patient is not considered high risk. Based on patient feedback/results of the assessment, the therapy is  still appropriate.   No medication-related problem(s) or patient need(s) identified that would require a care plan. Follow up in 180 days     Immunizations  Immunization status: up to date and documented, missing doses of Covid booster (did not discuss today). [id number] (did not discuss today). Will follow-up next SMS.      Drug Interactions  Naproxen + MTX: increased risk of MTX toxicities. Patient states never has had an issue. Provider will be continuing to monitor blood work as well. No changes today, will follow-up future SMS.    Naproxen + escitalopram: increased risk of bleeding. Patient denies and takes with food. Educated patient on risk and provided verbal understanding. No further action needed at this time.     Other Identified Potential Issues  Patient has completed SMS consent form and Telehealth consent form. No questions in regard to consent form.  Patient provided verbal consent for [facility] patient consent form at previous visit on 11/13/2023.   Mental health:  States her dad needs to go into a home and her daughter moved to another state. Has been feeling down lately. States is working thru the issues on her own and will reach out to provider if needed. No plans on harming herself per patient. Follow-up next SMS or sooner if needed.       PLAN  Goals of therapy, common side effects, medication storage, and administration reviewed with patient.  continue Enbrel 50mg every 7 days as prescribed   Recommended lab monitoring: CBC, CMP (specialist completing), TB (future - yearly)  Recommended vaccinations: Covid booster (patient will obtain)  Keep all scheduled appointments.        Aida MCDONOUGH

## 2024-11-29 NOTE — OB RN PATIENT PROFILE - NSNAMEOFMDOFFICE_OBGYN_ALL_OB_FT
Case abraham followed up with Dr Acuna and she agreed to give first dose of Ertapenem inpatient and pt will get second dose at [facility] office located in [place], [place]. [facility] will provide parents with information on office location and time.    Dr. freed
